# Patient Record
Sex: MALE | Race: WHITE | Employment: OTHER | URBAN - METROPOLITAN AREA
[De-identification: names, ages, dates, MRNs, and addresses within clinical notes are randomized per-mention and may not be internally consistent; named-entity substitution may affect disease eponyms.]

---

## 2017-10-25 ENCOUNTER — TELEPHONE (OUTPATIENT)
Dept: CARDIOLOGY CLINIC | Age: 67
End: 2017-10-25

## 2017-10-25 RX ORDER — LISINOPRIL 5 MG/1
5 TABLET ORAL DAILY
Qty: 90 TABLET | Refills: 3 | Status: SHIPPED | OUTPATIENT
Start: 2017-10-25 | End: 2018-10-24 | Stop reason: SDUPTHER

## 2017-10-25 NOTE — TELEPHONE ENCOUNTER
Medication Refill    When was your last appointment with cardiology? Upcoming appt is sched for 11/15/17   (if 1year or longer, please schedule an appointment)    Medication needing refilled: lisinopril (PRINIVIL;ZESTRIL) 5 MG tablet         Doseage of the medication:    How are you taking this medication (QD, BID, TID, QID, PRN): Take 1 tablet by mouth daily    Patient want a 30 or 90 day supply called in:    Which Pharmacy are we sending the medication to:  [facility], [address] [place] [address]    Pharmacy Phone number:    Pharmacy Fax number:

## 2017-11-21 ENCOUNTER — OFFICE VISIT (OUTPATIENT)
Dept: CARDIOLOGY CLINIC | Age: 67
End: 2017-11-21

## 2017-11-21 VITALS
HEIGHT: 73 IN | BODY MASS INDEX: 30.48 KG/M2 | WEIGHT: 230 LBS | OXYGEN SATURATION: 96 % | SYSTOLIC BLOOD PRESSURE: 130 MMHG | DIASTOLIC BLOOD PRESSURE: 72 MMHG | HEART RATE: 70 BPM

## 2017-11-21 DIAGNOSIS — I35.9 AORTIC VALVE DISORDER: Primary | ICD-10-CM

## 2017-11-21 DIAGNOSIS — I42.8 OTHER CARDIOMYOPATHY (HCC): ICD-10-CM

## 2017-11-21 PROCEDURE — G8417 CALC BMI ABV UP PARAM F/U: HCPCS | Performed by: NURSE PRACTITIONER

## 2017-11-21 PROCEDURE — G8484 FLU IMMUNIZE NO ADMIN: HCPCS | Performed by: NURSE PRACTITIONER

## 2017-11-21 PROCEDURE — 99213 OFFICE O/P EST LOW 20 MIN: CPT | Performed by: NURSE PRACTITIONER

## 2017-11-21 PROCEDURE — 1036F TOBACCO NON-USER: CPT | Performed by: NURSE PRACTITIONER

## 2017-11-21 PROCEDURE — 4040F PNEUMOC VAC/ADMIN/RCVD: CPT | Performed by: NURSE PRACTITIONER

## 2017-11-21 PROCEDURE — 3017F COLORECTAL CA SCREEN DOC REV: CPT | Performed by: NURSE PRACTITIONER

## 2017-11-21 PROCEDURE — 93000 ELECTROCARDIOGRAM COMPLETE: CPT | Performed by: NURSE PRACTITIONER

## 2017-11-21 PROCEDURE — G8427 DOCREV CUR MEDS BY ELIG CLIN: HCPCS | Performed by: NURSE PRACTITIONER

## 2017-11-21 PROCEDURE — 1123F ACP DISCUSS/DSCN MKR DOCD: CPT | Performed by: NURSE PRACTITIONER

## 2017-11-21 RX ORDER — VITAMIN E 268 MG
400 CAPSULE ORAL DAILY
COMMUNITY
End: 2021-03-01

## 2017-11-21 RX ORDER — MULTIVIT WITH MINERALS/LUTEIN
400 TABLET ORAL DAILY
COMMUNITY
End: 2017-11-21 | Stop reason: CLARIF

## 2017-11-21 RX ORDER — CARVEDILOL 3.12 MG/1
3.12 TABLET ORAL 2 TIMES DAILY WITH MEALS
Qty: 180 TABLET | Refills: 3 | Status: SHIPPED | OUTPATIENT
Start: 2017-11-21 | End: 2018-12-05 | Stop reason: SDUPTHER

## 2017-11-21 RX ORDER — CARVEDILOL 3.12 MG/1
3.12 TABLET ORAL 2 TIMES DAILY WITH MEALS
Qty: 180 TABLET | Refills: 3 | Status: SHIPPED | OUTPATIENT
Start: 2017-11-21 | End: 2017-11-21 | Stop reason: SDUPTHER

## 2017-11-21 NOTE — LETTER
(with meals) 180 tablet 3    amoxicillin (AMOXIL) 500 MG capsule Take 2 gm po 30-60 minutes before dental work. 4 capsule 0    Cholecalciferol (VITAMIN D PO) Take by mouth 400 units qd      aspirin 81 MG tablet Take 81 mg by mouth daily. Objective:   PHYSICAL EXAM:        Vitals:    11/21/17 0853 11/21/17 0857 11/21/17 0911   BP: (!) 140/90 124/80 130/72   Site: Left Arm Left Arm Right Arm   Position: Sitting Sitting    Cuff Size: Medium Adult Medium Adult Large Adult   Pulse: 70     SpO2: 96%     Weight: 230 lb (104.3 kg)     Height: 6' 1\" (1.854 m)         VITALS:  /72 (Site: Right Arm, Cuff Size: Large Adult)   Pulse 70   Ht 6' 1\" (1.854 m)   Wt 230 lb (104.3 kg)   SpO2 96%   BMI 30.34 kg/m²    CONSTITUTIONAL: Cooperative, no apparent distress, and appears well nourished / developed  NEUROLOGIC:  Awake and orientated to person, place and time. PSYCH: Calm affect. SKIN: Warm and dry. HEENT: Sclera non-icteric, normocephalic, neck supple, no elevation of JVP, normal carotid pulses with no bruits and thyroid normal size. LUNGS:  No increased work of breathing and clear to auscultation, no crackles or wheezing  CARDIOVASCULAR:  Regular rate with occ ectopic bt 72 and rhythm with no murmurs, gallops, rubs, or abnormal heart sounds, normal PMI. The apical impulses not displaced  JVP less than 8 cm H2O  Heart tones are crisp and normal  Cervical veins are not engorged  The carotid upstroke is normal in amplitude and contour without delay or bruit  JVP is not elevated  ABDOMEN:  Normal bowel sounds, non-distended and non-tender to palpation  EXT: No edema, no calf tenderness. Pulses are present bilaterally.     DATA:      LABS: 10/3/17:   Na+ 146 K+ 4.6 chl 106 CO2 24 BUN 18 creatinine 1.08 glu 88   Vitamin-D 28.8   A1c 5.1%    trig 109 HDL 51     Radiology Review:  Pertinent images / reports were reviewed as a part of this visit and reveals the following:    Last Echo: May '16: my treatment were discussed. The patient is not currently smoking. The risks related to smoking were reviewed with the patient. Recommend maintaining a smoke-free lifestyle. Patient is on a beta-blocker  Patient is on an ace-i  Patient is not on a statin: neg CAD    Antiplatelet therapy has been recommended / prescribed for this patient. Education conducted on adverse reactions including bleeding was discussed. The patient verbalizes understanding not to stop medications without discussing with us. Discussed exercise: 30-60 minutes 7 days/week  Discussed diet. Thank you for allowing to us to participate in the care of [address] [place] [address]. If you have questions, please do not hesitate to call me. I look forward to following Freddy along with you.     Sincerely,        Tania Cerna NP

## 2017-11-21 NOTE — COMMUNICATION BODY
triphasic waveform.  No periaortic fluid or fibrosis.  There is no   aneurysm.  Aorta measures 2.2 cm proximally and tapers to a normal   bifurcation of 1.2 cm.       Iliacs:       Right and left common iliac vessels measure 1.6 and 1.3 cm, right and left   respectively           Impression   No evidence of abdominal aortic aneurysm     11/21/16: MUGA:  Unable to complete MUGA d/t ectopic beats on EKG. Dr. Phillip Reed assessed patient and EKG during attempt to image, explained problem to patient that statistical data would be invalid and MUGA cannot be acquired. Patient acknowledged and will follow up with Dr. Yazan Lopez:     1. Aortic valve disorder   ~s/p repair '96; s/p AVR porcine prothesis Jan '16  ~stable : exercises routinely   ~ASA / BB EKG 12 Lead   2. Other cardiomyopathy ([address]   ~Decreased left ventricular systolic function with an EF 25% by echo '16  ~neg for decompensation   ~remains on BB / ace-i EKG 12 Lead         I had the opportunity to review the clinical symptoms and presentation of Freddy Trinidad. Plan:     1. EKG: sinus rhythm , RBBB  2. F/U in six months     Overall the patient is stable from CV standpoint    I have addresed the patient's cardiac risk factors and adjusted pharmacologic treatment as needed. In addition, I have reinforced the need for patient directed risk factor modification. Further evaluation will be based upon the patient's clinical course and testing results. All questions and concerns were addressed to the patient. Alternatives to my treatment were discussed. The patient is not currently smoking. The risks related to smoking were reviewed with the patient. Recommend maintaining a smoke-free lifestyle. Patient is on a beta-blocker  Patient is on an ace-i  Patient is not on a statin: neg CAD    Antiplatelet therapy has been recommended / prescribed for this patient. Education conducted on adverse reactions including bleeding was discussed.     The

## 2017-11-21 NOTE — PROGRESS NOTES
amplitude and contour without delay or bruit  JVP is not elevated  ABDOMEN:  Normal bowel sounds, non-distended and non-tender to palpation  EXT: No edema, no calf tenderness. Pulses are present bilaterally. DATA:      LABS: 10/3/17:   Na+ 146 K+ 4.6 chl 106 CO2 24 BUN 18 creatinine 1.08 glu 88   Vitamin-D 28.8   A1c 5.1%    trig 109 HDL 51     Radiology Review:  Pertinent images / reports were reviewed as a part of this visit and reveals the following:    MUGA: Feb '15:  MUGA Conclusions    Abnormal resting left ventricular function with moderate-severe global    hypokinesis and EF= 36% .    Study quality is hampered by frequent arrhythmia. Last Echo: May '16:  Summary  Mildly dilated left ventricle. Mild concentric left ventricular hypertrophy. Decreased left ventricular systolic function with an EF 25%. Mild to moderate mitral regurgitation. Dilated left atrium. Mild tricuspid regurgitation with RVSP estimated at 34 mmHg. Pacemaker / ICD lead was visualized in the right atrium. Trivial pulmonic regurgitation. US aorta: Nov '16:  FINDINGS:   Aorta:       Normal triphasic waveform.  No periaortic fluid or fibrosis.  There is no   aneurysm.  Aorta measures 2.2 cm proximally and tapers to a normal   bifurcation of 1.2 cm.       Iliacs:       Right and left common iliac vessels measure 1.6 and 1.3 cm, right and left   respectively           Impression   No evidence of abdominal aortic aneurysm     11/21/16: MUGA:  Unable to complete MUGA d/t ectopic beats on EKG. Dr. Liliana Sherwood assessed patient and EKG during attempt to image, explained problem to patient that statistical data would be invalid and MUGA cannot be acquired. Patient acknowledged and will follow up with Dr. Maxim Bahena:     1. Aortic valve disorder   ~s/p repair '96; s/p AVR porcine prothesis Jan '16  ~stable : exercises routinely   ~ASA / BB EKG 12 Lead   2.  Other cardiomyopathy ([address]   ~Decreased left ventricular systolic function with an EF 25% by echo '16  ~neg for decompensation   ~remains on BB / ace-i EKG 12 Lead         I had the opportunity to review the clinical symptoms and presentation of Freddy Trinidad. Plan:     1. EKG: sinus rhythm , RBBB  2. F/U in six months     Overall the patient is stable from CV standpoint    I have addresed the patient's cardiac risk factors and adjusted pharmacologic treatment as needed. In addition, I have reinforced the need for patient directed risk factor modification. Further evaluation will be based upon the patient's clinical course and testing results. All questions and concerns were addressed to the patient. Alternatives to my treatment were discussed. The patient is not currently smoking. The risks related to smoking were reviewed with the patient. Recommend maintaining a smoke-free lifestyle. Patient is on a beta-blocker  Patient is on an ace-i  Patient is not on a statin: neg CAD    Antiplatelet therapy has been recommended / prescribed for this patient. Education conducted on adverse reactions including bleeding was discussed. The patient verbalizes understanding not to stop medications without discussing with us. Discussed exercise: 30-60 minutes 7 days/week  Discussed diet. Thank you for allowing to us to participate in the care of [address] [place] [address].     AMY Moscoso    Documentation of today's visit sent to PCP

## 2018-05-30 ENCOUNTER — OFFICE VISIT (OUTPATIENT)
Dept: CARDIOLOGY CLINIC | Age: 68
End: 2018-05-30

## 2018-05-30 VITALS
DIASTOLIC BLOOD PRESSURE: 80 MMHG | SYSTOLIC BLOOD PRESSURE: 110 MMHG | HEIGHT: 73 IN | WEIGHT: 225 LBS | BODY MASS INDEX: 29.82 KG/M2 | HEART RATE: 60 BPM

## 2018-05-30 DIAGNOSIS — Z95.810 AUTOMATIC IMPLANTABLE CARDIOVERTER-DEFIBRILLATOR IN SITU: Chronic | ICD-10-CM

## 2018-05-30 DIAGNOSIS — I35.9 AORTIC VALVE DISORDER: Chronic | ICD-10-CM

## 2018-05-30 DIAGNOSIS — I42.9 PRIMARY CARDIOMYOPATHY (HCC): Chronic | ICD-10-CM

## 2018-05-30 DIAGNOSIS — E78.5 HYPERLIPIDEMIA, UNSPECIFIED HYPERLIPIDEMIA TYPE: ICD-10-CM

## 2018-05-30 DIAGNOSIS — I35.0 NONRHEUMATIC AORTIC VALVE STENOSIS: Primary | ICD-10-CM

## 2018-05-30 PROCEDURE — 1123F ACP DISCUSS/DSCN MKR DOCD: CPT | Performed by: INTERNAL MEDICINE

## 2018-05-30 PROCEDURE — 1036F TOBACCO NON-USER: CPT | Performed by: INTERNAL MEDICINE

## 2018-05-30 PROCEDURE — 4040F PNEUMOC VAC/ADMIN/RCVD: CPT | Performed by: INTERNAL MEDICINE

## 2018-05-30 PROCEDURE — 3017F COLORECTAL CA SCREEN DOC REV: CPT | Performed by: INTERNAL MEDICINE

## 2018-05-30 PROCEDURE — G8427 DOCREV CUR MEDS BY ELIG CLIN: HCPCS | Performed by: INTERNAL MEDICINE

## 2018-05-30 PROCEDURE — 99213 OFFICE O/P EST LOW 20 MIN: CPT | Performed by: INTERNAL MEDICINE

## 2018-05-30 PROCEDURE — G8417 CALC BMI ABV UP PARAM F/U: HCPCS | Performed by: INTERNAL MEDICINE

## 2018-10-24 RX ORDER — LISINOPRIL 5 MG/1
TABLET ORAL
Qty: 90 TABLET | Refills: 3 | Status: SHIPPED | OUTPATIENT
Start: 2018-10-24 | End: 2019-02-07 | Stop reason: SDUPTHER

## 2018-12-07 RX ORDER — CARVEDILOL 3.12 MG/1
TABLET ORAL
Qty: 180 TABLET | Refills: 0 | Status: SHIPPED | OUTPATIENT
Start: 2018-12-07 | End: 2019-03-12 | Stop reason: SDUPTHER

## 2019-01-10 ENCOUNTER — TELEPHONE (OUTPATIENT)
Dept: CARDIOLOGY CLINIC | Age: 69
End: 2019-01-10

## 2019-02-07 ENCOUNTER — OFFICE VISIT (OUTPATIENT)
Dept: CARDIOLOGY CLINIC | Age: 69
End: 2019-02-07
Payer: MEDICARE

## 2019-02-07 ENCOUNTER — HOSPITAL ENCOUNTER (OUTPATIENT)
Dept: NON INVASIVE DIAGNOSTICS | Age: 69
Discharge: HOME | End: 2019-02-07
Payer: MEDICARE

## 2019-02-07 VITALS
BODY MASS INDEX: 29.42 KG/M2 | WEIGHT: 222 LBS | SYSTOLIC BLOOD PRESSURE: 130 MMHG | HEART RATE: 63 BPM | HEIGHT: 73 IN | OXYGEN SATURATION: 96 % | DIASTOLIC BLOOD PRESSURE: 86 MMHG

## 2019-02-07 DIAGNOSIS — I15.9 SECONDARY HYPERTENSION: ICD-10-CM

## 2019-02-07 DIAGNOSIS — I35.9 AORTIC VALVE DISORDER: Primary | Chronic | ICD-10-CM

## 2019-02-07 DIAGNOSIS — I42.9 PRIMARY CARDIOMYOPATHY (HCC): Chronic | ICD-10-CM

## 2019-02-07 DIAGNOSIS — I49.3 PVC (PREMATURE VENTRICULAR CONTRACTION): ICD-10-CM

## 2019-02-07 DIAGNOSIS — R00.2 PALPITATIONS: ICD-10-CM

## 2019-02-07 PROBLEM — I10 HYPERTENSION: Status: ACTIVE | Noted: 2019-02-07

## 2019-02-07 LAB
LEFT VENTRICULAR EJECTION FRACTION HIGH VALUE: 35 %
LEFT VENTRICULAR EJECTION FRACTION MODE: NORMAL
LV EF: 30 %
LV EF: 33 %
LVEF MODALITY: NORMAL

## 2019-02-07 PROCEDURE — 4040F PNEUMOC VAC/ADMIN/RCVD: CPT | Performed by: INTERNAL MEDICINE

## 2019-02-07 PROCEDURE — 3017F COLORECTAL CA SCREEN DOC REV: CPT | Performed by: INTERNAL MEDICINE

## 2019-02-07 PROCEDURE — 93306 TTE W/DOPPLER COMPLETE: CPT

## 2019-02-07 PROCEDURE — 93000 ELECTROCARDIOGRAM COMPLETE: CPT | Performed by: INTERNAL MEDICINE

## 2019-02-07 PROCEDURE — 1101F PT FALLS ASSESS-DOCD LE1/YR: CPT | Performed by: INTERNAL MEDICINE

## 2019-02-07 PROCEDURE — 93225 XTRNL ECG REC<48 HRS REC: CPT | Performed by: INTERNAL MEDICINE

## 2019-02-07 PROCEDURE — 99214 OFFICE O/P EST MOD 30 MIN: CPT | Performed by: INTERNAL MEDICINE

## 2019-02-07 PROCEDURE — G8417 CALC BMI ABV UP PARAM F/U: HCPCS | Performed by: INTERNAL MEDICINE

## 2019-02-07 PROCEDURE — G8427 DOCREV CUR MEDS BY ELIG CLIN: HCPCS | Performed by: INTERNAL MEDICINE

## 2019-02-07 PROCEDURE — 1036F TOBACCO NON-USER: CPT | Performed by: INTERNAL MEDICINE

## 2019-02-07 PROCEDURE — 1123F ACP DISCUSS/DSCN MKR DOCD: CPT | Performed by: INTERNAL MEDICINE

## 2019-02-07 PROCEDURE — G8484 FLU IMMUNIZE NO ADMIN: HCPCS | Performed by: INTERNAL MEDICINE

## 2019-02-07 RX ORDER — LISINOPRIL 10 MG/1
10 TABLET ORAL DAILY
Qty: 90 TABLET | Refills: 3 | Status: SHIPPED | OUTPATIENT
Start: 2019-02-07 | End: 2019-11-13 | Stop reason: ALTCHOICE

## 2019-02-08 ENCOUNTER — OFFICE VISIT (OUTPATIENT)
Dept: CARDIOLOGY CLINIC | Age: 69
End: 2019-02-08
Payer: MEDICARE

## 2019-02-08 VITALS
HEIGHT: 73 IN | BODY MASS INDEX: 30.76 KG/M2 | WEIGHT: 232.1 LBS | DIASTOLIC BLOOD PRESSURE: 80 MMHG | SYSTOLIC BLOOD PRESSURE: 158 MMHG | HEART RATE: 76 BPM

## 2019-02-08 DIAGNOSIS — I42.0 DILATED CARDIOMYOPATHY (HCC): Primary | ICD-10-CM

## 2019-02-08 DIAGNOSIS — I10 BENIGN ESSENTIAL HTN: ICD-10-CM

## 2019-02-08 DIAGNOSIS — I49.3 PVC (PREMATURE VENTRICULAR CONTRACTION): ICD-10-CM

## 2019-02-08 DIAGNOSIS — E78.2 MIXED HYPERLIPIDEMIA: ICD-10-CM

## 2019-02-08 DIAGNOSIS — Z95.2 S/P AVR: ICD-10-CM

## 2019-02-08 PROCEDURE — 3017F COLORECTAL CA SCREEN DOC REV: CPT | Performed by: INTERNAL MEDICINE

## 2019-02-08 PROCEDURE — 99215 OFFICE O/P EST HI 40 MIN: CPT | Performed by: INTERNAL MEDICINE

## 2019-02-08 PROCEDURE — G8417 CALC BMI ABV UP PARAM F/U: HCPCS | Performed by: INTERNAL MEDICINE

## 2019-02-08 PROCEDURE — G8427 DOCREV CUR MEDS BY ELIG CLIN: HCPCS | Performed by: INTERNAL MEDICINE

## 2019-02-08 PROCEDURE — 1123F ACP DISCUSS/DSCN MKR DOCD: CPT | Performed by: INTERNAL MEDICINE

## 2019-02-08 PROCEDURE — 1101F PT FALLS ASSESS-DOCD LE1/YR: CPT | Performed by: INTERNAL MEDICINE

## 2019-02-08 PROCEDURE — 1036F TOBACCO NON-USER: CPT | Performed by: INTERNAL MEDICINE

## 2019-02-08 PROCEDURE — 4040F PNEUMOC VAC/ADMIN/RCVD: CPT | Performed by: INTERNAL MEDICINE

## 2019-02-08 PROCEDURE — G8484 FLU IMMUNIZE NO ADMIN: HCPCS | Performed by: INTERNAL MEDICINE

## 2019-02-15 PROCEDURE — 93227 XTRNL ECG REC<48 HR R&I: CPT | Performed by: INTERNAL MEDICINE

## 2019-02-18 ENCOUNTER — TELEPHONE (OUTPATIENT)
Dept: CARDIOLOGY CLINIC | Age: 69
End: 2019-02-18

## 2019-03-15 RX ORDER — CARVEDILOL 3.12 MG/1
3.12 TABLET ORAL 2 TIMES DAILY WITH MEALS
Qty: 180 TABLET | Refills: 1 | Status: SHIPPED | OUTPATIENT
Start: 2019-03-15 | End: 2019-06-15 | Stop reason: SDUPTHER

## 2019-03-15 RX ORDER — CARVEDILOL 3.12 MG/1
TABLET ORAL
Qty: 180 TABLET | Refills: 0 | Status: SHIPPED | OUTPATIENT
Start: 2019-03-15 | End: 2019-05-28 | Stop reason: SDUPTHER

## 2019-05-23 NOTE — PROGRESS NOTES
[address]   Cardiac Followup    Referring Provider:  Mendel Bers, MD     Chief  Complaint--AVR, CM, PVCs    History of Present Illness:  Mr Shae Meza is a  68 y.o.male seen as a follow up for  AVR(repair at  [facility], redo ) cardiomyopathy(AICD--second device--battery , was turned off,  At the time, EF 45%)htn, hchol    Today, he has no exertional or resting chest pain,sob, palpitations dizziness. He has  No edema,  No syncope. He is active, able to mow the yard about 1 1/2 hours without stopping or symptoms. Patient is compliant  with medication and is tolerating them well without side effects. He has frequent PVCs on Holter and is considering ICD upgrade and or PVC ablation with Dr Israel Chang. Past Medical History:   has a past medical history of Hypertension, Non-ischemic cardiomyopathy ([address] and Severe aortic stenosis. Surgical History:   has a past surgical history that includes hernia repair (Right, ); Aortic valvuloplasty (); Cardiac defibrillator placement (/ ); Aortic valve replacement (2016); Coronary artery bypass graft; and Cataract removal with implant (Left). Social History:   reports that he has never smoked. He has never used smokeless tobacco. He reports that he drinks about 1.8 - 2.4 oz of alcohol per week. He reports that he does not use drugs. Family History:  family history is not on file. Home Medications:  Current Outpatient Medications   Medication Sig Dispense Refill    carvedilol (COREG) 3.125 MG tablet Take 1 tablet by mouth 2 times daily (with meals) 180 tablet 1    lisinopril (PRINIVIL;ZESTRIL) 10 MG tablet Take 1 tablet by mouth daily 90 tablet 3    vitamin E 400 UNIT capsule Take 400 Units by mouth daily      Cholecalciferol (VITAMIN D PO) Take 1,000 Units by mouth daily       aspirin 81 MG tablet Take 81 mg by mouth daily.       amoxicillin (AMOXIL) 500 MG capsule Take 2 gm po 30-60 minutes before dental work. 4 capsule 0     No current facility-administered medications for this visit. Allergies:  Patient has no known allergies. Review of Systems:   · Constitutional: there has been no unanticipated weight loss. There's been no change in energy level, sleep pattern, or activity level. · Eyes: No visual changes or diplopia. No scleral icterus. · ENT: No Headaches, hearing loss or vertigo. No mouth sores or sore throat. · Cardiovascular: Reviewed in HPI  · Respiratory: No cough or wheezing, no sputum production. No hematemesis. · Gastrointestinal: No abdominal pain, appetite loss, blood in stools. No change in bowel or bladder habits. · Genitourinary: No dysuria, trouble voiding, or hematuria. · Musculoskeletal:  No gait disturbance, weakness or joint complaints. · Integumentary: No rash or pruritis. · Neurological: No headache, diplopia, change in muscle strength, numbness or tingling. No change in gait, balance, coordination, mood, affect, memory, mentation, behavior. · Psychiatric: No anxiety, no depression. · Endocrine: No malaise, fatigue or temperature intolerance. No excessive thirst, fluid intake, or urination. No tremor. · Hematologic/Lymphatic: No abnormal bruising or bleeding, blood clots or swollen lymph nodes. · Allergic/Immunologic: No nasal congestion or hives. Physical Examination:    Vitals:    05/28/19 0911   BP: 120/86   Pulse:    SpO2:         Constitutional and General Appearance:   . NAD   SKIN:  .     Warm and dry  EYES:    .     EOMI  Neck:   . Normal carotid contour  Respiratory:  Normal excursion and expansion without use of accessory muscles  Resp Auscultation: Normal breath sounds without dullness  Cardiovascular: The apical impulses not displaced  Heart tones are crisp and normal  Cervical veins are not engorged  Normal S1S2, No S3, No Murmur  Peripheral pulses are symmetrical and full  Extremities:   There is no clubbing, cyanosis of the extremities. No edema  Femoral Arteries: 2+ and equal  Pedal Pulses: 2+ and equal   Abdomen:  No masses or tenderness  Liver/Spleen: No Abnormalities Noted  Neurological/Psychiatric:  Alert and oriented in all spheres  Moves all extremities well  Exhibits normal gait balance and coordination  No abnormalities of mood, affect, memory      All testing and labs listed below were personally reviewed. 2/2015  MUGA  Ef 36%    2015--normal coronaries    2015 carotids--no significant stenosis  2016 abd-no AAA    Assessment:   Aortic valve disorder/Aortic Valve repair 1996, redo 1/16  Stable  Will repeat echo in one year    Nonischemic cardiomyopathy  5/20/16 echo EF 25%  2/7/19  Echo  EF 30-35%  No  Signs of chf on exam  CRT not  Thought to be helpful by Dr Miranda Dec exam 2/8/19  Will discuss AICD with him    htn--/86 (Site: Left Upper Arm, Position: Sitting, Cuff Size: Medium Adult)   Pulse 52   Ht 6' 1\" (1.854 m)   Wt 228 lb 6.4 oz (103.6 kg)   SpO2 96%   BMI 30.13 kg/m²    Stable,tolerating coreg and  lisinopril      PVC's--1st degrees AV block, RBBB left axis bifasicular block,multiple pvc's, trigeminal pvc's--discussed with Dr Powell--recommend Chris John this is placed, will then be able to optimize medications for  Cardiomyopathy  2/19 holter monitor  bigeminy trigeminy  Ablation will be arranged with Dr Kenzie Mar:  Refer back to Dr Ana Torres regarding upgrade of  Pacemaker and ablation--will  review holter monitor with Dr Ana Torres  follow  Up in 6 months    Thank you for allowing me to participate in the care of this individual.    Sudha Garcia M.D., [facility]. Scribe Attestation:  Cristina Nixon am scribing for and in the presence of Yasemin Rico MD.   Sean Pallas 05/28/19 9:45 AM   Provider Maxine Norris is working as a scribe for and in the presence of me Yasemin Rico MD).   Working as a scribe, Angelica may have prepopulated components of this note with my historical  intellectual property under my direct supervision. Any additions to this intellectual property were performed in my presence and at my direction. Furthermore, the content and accuracy of this note have been reviewed by me Foreign Henry MD).

## 2019-05-28 ENCOUNTER — OFFICE VISIT (OUTPATIENT)
Dept: CARDIOLOGY CLINIC | Age: 69
End: 2019-05-28
Payer: MEDICARE

## 2019-05-28 VITALS
BODY MASS INDEX: 30.27 KG/M2 | HEIGHT: 73 IN | SYSTOLIC BLOOD PRESSURE: 120 MMHG | DIASTOLIC BLOOD PRESSURE: 86 MMHG | WEIGHT: 228.4 LBS | OXYGEN SATURATION: 96 % | HEART RATE: 52 BPM

## 2019-05-28 DIAGNOSIS — Z95.2 S/P AVR: ICD-10-CM

## 2019-05-28 DIAGNOSIS — I42.0 DILATED CARDIOMYOPATHY (HCC): Primary | ICD-10-CM

## 2019-05-28 DIAGNOSIS — I49.3 PVC (PREMATURE VENTRICULAR CONTRACTION): ICD-10-CM

## 2019-05-28 PROCEDURE — 1036F TOBACCO NON-USER: CPT | Performed by: INTERNAL MEDICINE

## 2019-05-28 PROCEDURE — G8427 DOCREV CUR MEDS BY ELIG CLIN: HCPCS | Performed by: INTERNAL MEDICINE

## 2019-05-28 PROCEDURE — G8417 CALC BMI ABV UP PARAM F/U: HCPCS | Performed by: INTERNAL MEDICINE

## 2019-05-28 PROCEDURE — 4040F PNEUMOC VAC/ADMIN/RCVD: CPT | Performed by: INTERNAL MEDICINE

## 2019-05-28 PROCEDURE — 3017F COLORECTAL CA SCREEN DOC REV: CPT | Performed by: INTERNAL MEDICINE

## 2019-05-28 PROCEDURE — 99214 OFFICE O/P EST MOD 30 MIN: CPT | Performed by: INTERNAL MEDICINE

## 2019-05-28 PROCEDURE — 1123F ACP DISCUSS/DSCN MKR DOCD: CPT | Performed by: INTERNAL MEDICINE

## 2019-05-30 ENCOUNTER — TELEPHONE (OUTPATIENT)
Dept: CARDIOLOGY CLINIC | Age: 69
End: 2019-05-30

## 2019-05-30 NOTE — TELEPHONE ENCOUNTER
Pt saw DGB on 5-28 and Vickie Barrios asked him to call her if he didn't hear anything from [facility] scheduling an appt. He hasn't heard anything so he is calling hospitals back. Pls call to advise. Thank you.

## 2019-05-31 NOTE — TELEPHONE ENCOUNTER
Per Dr Neelam Murray  Please schedule patient for an ablation and an ICD upgrade  He has discussed it with the patient  RACHELB/MM

## 2019-06-10 ENCOUNTER — TELEPHONE (OUTPATIENT)
Dept: CARDIOLOGY CLINIC | Age: 69
End: 2019-06-10

## 2019-06-11 ENCOUNTER — TELEPHONE (OUTPATIENT)
Dept: CARDIOLOGY CLINIC | Age: 69
End: 2019-06-11

## 2019-06-11 NOTE — TELEPHONE ENCOUNTER
CARDIAC CLEARANCE     What type of procedure are you having? lens correction of left eye cataract     Which physician is performing your procedure? Not sure of yet    When is your procedure scheduled for? 6-13    Where are you having this procedure? [facility]    Are you taking Blood Thinners? If so what? (Name/dose/frequesncy)     Does the surgeon want you to stop your blood thinner? If so for how long? Phone Number and Contact Name for Physicians office: [phone]    Fax number to send Bedřicha Smetany [facility] Surgical Coordinator Isaura Ferreira [phone] with any questions.

## 2019-06-11 NOTE — LETTER
[facility] [address] [address]  Phone: [phone]  Fax: [phone]    Lucia Redd MD        June 11, 2019     Patient: Kushal Koroma   YOB: 1950   Date of Visit: 6/11/2019       To Whom It May Concern: It is my medical opinion that Minda Gouty patient can be cleared. .    If you have any questions or concerns, please don't hesitate to call.     Sincerely,        Lucia Redd MD

## 2019-06-17 RX ORDER — CARVEDILOL 3.12 MG/1
3.12 TABLET ORAL 2 TIMES DAILY WITH MEALS
Qty: 180 TABLET | Refills: 3 | Status: SHIPPED | OUTPATIENT
Start: 2019-06-17 | End: 2020-05-21 | Stop reason: SDUPTHER

## 2019-08-13 ENCOUNTER — HOSPITAL ENCOUNTER (OUTPATIENT)
Dept: CARDIAC CATH/INVASIVE PROCEDURES | Age: 69
Discharge: HOME | End: 2019-08-14
Attending: INTERNAL MEDICINE | Admitting: INTERNAL MEDICINE
Payer: MEDICARE

## 2019-08-13 PROBLEM — I49.3 PVC (PREMATURE VENTRICULAR CONTRACTION): Status: ACTIVE | Noted: 2019-08-13

## 2019-08-13 LAB
EKG ATRIAL RATE: 67 BPM
EKG DIAGNOSIS: NORMAL
EKG P AXIS: 40 DEGREES
EKG P-R INTERVAL: 208 MS
EKG Q-T INTERVAL: 482 MS
EKG QRS DURATION: 188 MS
EKG QTC CALCULATION (BAZETT): 509 MS
EKG R AXIS: -77 DEGREES
EKG T AXIS: 67 DEGREES
EKG VENTRICULAR RATE: 67 BPM
GFR AFRICAN AMERICAN: >60
GFR NON-AFRICAN AMERICAN: 60
GLUCOSE BLD-MCNC: 93 MG/DL (ref 70–99)
HEMOGLOBIN: 14.5 G/DL (ref 13.5–17.5)
PERFORMED ON: ABNORMAL
PLATELET # BLD: 134 K/UL (ref 135–450)
POC BUN: 20 MG/DL (ref 7–18)
POC CREATININE: 1.2 MG/DL (ref 0.8–1.3)
POC HEMATOCRIT: 40 % (ref 40.5–52.5)
POC POTASSIUM: 4.1 MMOL/L (ref 3.5–5.1)
POC SAMPLE TYPE: ABNORMAL
POC SODIUM: 144 MMOL/L (ref 136–145)
WBC # BLD: 5.9 K/UL (ref 4–11)

## 2019-08-13 PROCEDURE — 93623 PRGRMD STIMJ&PACG IV RX NFS: CPT | Performed by: INTERNAL MEDICINE

## 2019-08-13 PROCEDURE — 93462 L HRT CATH TRNSPTL PUNCTURE: CPT | Performed by: INTERNAL MEDICINE

## 2019-08-13 PROCEDURE — 2500000003 HC RX 250 WO HCPCS

## 2019-08-13 PROCEDURE — 6360000002 HC RX W HCPCS

## 2019-08-13 PROCEDURE — 99152 MOD SED SAME PHYS/QHP 5/>YRS: CPT | Performed by: INTERNAL MEDICINE

## 2019-08-13 PROCEDURE — 33262 RMVL& REPLC PULSE GEN 1 LEAD: CPT

## 2019-08-13 PROCEDURE — 85014 HEMATOCRIT: CPT

## 2019-08-13 PROCEDURE — 2720000010 HC SURG SUPPLY STERILE

## 2019-08-13 PROCEDURE — 85347 COAGULATION TIME ACTIVATED: CPT

## 2019-08-13 PROCEDURE — 93654 COMPRE EP EVAL TX VT: CPT | Performed by: INTERNAL MEDICINE

## 2019-08-13 PROCEDURE — 93623 PRGRMD STIMJ&PACG IV RX NFS: CPT

## 2019-08-13 PROCEDURE — 93010 ELECTROCARDIOGRAM REPORT: CPT | Performed by: INTERNAL MEDICINE

## 2019-08-13 PROCEDURE — 99152 MOD SED SAME PHYS/QHP 5/>YRS: CPT

## 2019-08-13 PROCEDURE — 82947 ASSAY GLUCOSE BLOOD QUANT: CPT

## 2019-08-13 PROCEDURE — 93462 L HRT CATH TRNSPTL PUNCTURE: CPT

## 2019-08-13 PROCEDURE — 93662 INTRACARDIAC ECG (ICE): CPT | Performed by: INTERNAL MEDICINE

## 2019-08-13 PROCEDURE — 99153 MOD SED SAME PHYS/QHP EA: CPT

## 2019-08-13 PROCEDURE — 84295 ASSAY OF SERUM SODIUM: CPT

## 2019-08-13 PROCEDURE — C1759 CATH, INTRA ECHOCARDIOGRAPHY: HCPCS

## 2019-08-13 PROCEDURE — 94760 N-INVAS EAR/PLS OXIMETRY 1: CPT

## 2019-08-13 PROCEDURE — C1894 INTRO/SHEATH, NON-LASER: HCPCS

## 2019-08-13 PROCEDURE — 2580000003 HC RX 258

## 2019-08-13 PROCEDURE — 6370000000 HC RX 637 (ALT 250 FOR IP): Performed by: INTERNAL MEDICINE

## 2019-08-13 PROCEDURE — 85048 AUTOMATED LEUKOCYTE COUNT: CPT

## 2019-08-13 PROCEDURE — 2580000003 HC RX 258: Performed by: INTERNAL MEDICINE

## 2019-08-13 PROCEDURE — 33262 RMVL& REPLC PULSE GEN 1 LEAD: CPT | Performed by: INTERNAL MEDICINE

## 2019-08-13 PROCEDURE — 93662 INTRACARDIAC ECG (ICE): CPT

## 2019-08-13 PROCEDURE — 93005 ELECTROCARDIOGRAM TRACING: CPT | Performed by: INTERNAL MEDICINE

## 2019-08-13 PROCEDURE — C1781 MESH (IMPLANTABLE): HCPCS

## 2019-08-13 PROCEDURE — 93654 COMPRE EP EVAL TX VT: CPT

## 2019-08-13 PROCEDURE — C1769 GUIDE WIRE: HCPCS

## 2019-08-13 PROCEDURE — 84132 ASSAY OF SERUM POTASSIUM: CPT

## 2019-08-13 PROCEDURE — C1732 CATH, EP, DIAG/ABL, 3D/VECT: HCPCS

## 2019-08-13 PROCEDURE — 85049 AUTOMATED PLATELET COUNT: CPT

## 2019-08-13 PROCEDURE — 85018 HEMOGLOBIN: CPT

## 2019-08-13 PROCEDURE — C1722 AICD, SINGLE CHAMBER: HCPCS

## 2019-08-13 PROCEDURE — 36415 COLL VENOUS BLD VENIPUNCTURE: CPT

## 2019-08-13 PROCEDURE — 84520 ASSAY OF UREA NITROGEN: CPT

## 2019-08-13 PROCEDURE — 93621 COMP EP EVL L PAC&REC C SINS: CPT

## 2019-08-13 PROCEDURE — 82565 ASSAY OF CREATININE: CPT

## 2019-08-13 PROCEDURE — 93621 COMP EP EVL L PAC&REC C SINS: CPT | Performed by: INTERNAL MEDICINE

## 2019-08-13 RX ORDER — SODIUM CHLORIDE 0.9 % (FLUSH) 0.9 %
10 SYRINGE (ML) INJECTION PRN
Status: DISCONTINUED | OUTPATIENT
Start: 2019-08-13 | End: 2019-08-14 | Stop reason: HOSPADM

## 2019-08-13 RX ORDER — CARVEDILOL 3.12 MG/1
3.12 TABLET ORAL 2 TIMES DAILY WITH MEALS
Status: DISCONTINUED | OUTPATIENT
Start: 2019-08-13 | End: 2019-08-14 | Stop reason: HOSPADM

## 2019-08-13 RX ORDER — ASPIRIN 81 MG/1
81 TABLET ORAL DAILY
Status: DISCONTINUED | OUTPATIENT
Start: 2019-08-13 | End: 2019-08-14 | Stop reason: HOSPADM

## 2019-08-13 RX ORDER — VITAMIN E 268 MG
400 CAPSULE ORAL DAILY
Status: DISCONTINUED | OUTPATIENT
Start: 2019-08-13 | End: 2019-08-13 | Stop reason: RX

## 2019-08-13 RX ORDER — ACETAMINOPHEN 325 MG/1
650 TABLET ORAL EVERY 4 HOURS PRN
Status: DISCONTINUED | OUTPATIENT
Start: 2019-08-13 | End: 2019-08-14 | Stop reason: HOSPADM

## 2019-08-13 RX ORDER — LISINOPRIL 10 MG/1
10 TABLET ORAL DAILY
Status: DISCONTINUED | OUTPATIENT
Start: 2019-08-13 | End: 2019-08-14 | Stop reason: HOSPADM

## 2019-08-13 RX ORDER — SODIUM CHLORIDE 0.9 % (FLUSH) 0.9 %
10 SYRINGE (ML) INJECTION EVERY 12 HOURS SCHEDULED
Status: DISCONTINUED | OUTPATIENT
Start: 2019-08-13 | End: 2019-08-14 | Stop reason: HOSPADM

## 2019-08-13 RX ADMIN — LISINOPRIL 10 MG: 10 TABLET ORAL at 18:47

## 2019-08-13 RX ADMIN — CARVEDILOL 3.12 MG: 3.12 TABLET, FILM COATED ORAL at 18:47

## 2019-08-13 RX ADMIN — ASPIRIN 81 MG: 81 TABLET, COATED ORAL at 18:49

## 2019-08-13 RX ADMIN — Medication 10 ML: at 20:58

## 2019-08-13 ASSESSMENT — PAIN SCALES - GENERAL
PAINLEVEL_OUTOF10: 0

## 2019-08-13 NOTE — H&P
[address] [facility]   Electrophysiology   Reason for Consultation: Luke Clifford Requesting Physician: Manda Hardin MD      Chief Complaint   Patient presents with    Follow-up     per  to discuss BiVICD    Hypertension        HPI: Mohamud Triplett is a 77 y.o. male seen for discussion of BiV upgrade on his device. He has a history of AVR(repair at [facility], redo1/16)cardiomyopathy, (AICD second device--battery , was turned off, at the time  His EF was  45%), HTN, Mindi Arellano is very active. He plays golf and is able to mow the lawn without fatigue. His EF has decreased again to 30-35%. He has frequent PVC's on ECG    Past Medical History:   Diagnosis Date    Hypertension     Non-ischemic cardiomyopathy ([address]     Severe aortic stenosis         Past Surgical History:   Procedure Laterality Date    AORTIC VALVE REPLACEMENT  2016    Dr. Christine Fuller - 27 mm Mosaic Ultra porcine valve #[id number]    AORTIC VALVULOPLASTY      AV repair @ [facility]  [phone]     for CM/ now turned off does not need    CATARACT REMOVAL WITH IMPLANT Left     CORONARY ARTERY BYPASS GRAFT      HERNIA REPAIR Right 2010       Allergies:  No Known Allergies    Social History:   reports that he has never smoked. He has never used smokeless tobacco. He reports that he drinks about 1.8 - 2.4 oz of alcohol per week . He reports that he does not use drugs. Family History:     Reviewed. Denies family history of sudden cardiac death, arrhythmia, premature CAD    Review of System:  All other systems reviewed and are negative except for that noted above.  Pertinent negatives are:   General: negative for fever, chills   Ophthalmic ROS: negative for - eye pain or loss of vision  ENT ROS: negative for - headaches, sore throat   Respiratory: negative for - cough, sputum  Cardiovascular: Reviewed in HPI  Gastrointestinal: negative for - abdominal pain,

## 2019-08-14 VITALS
TEMPERATURE: 96.2 F | SYSTOLIC BLOOD PRESSURE: 145 MMHG | HEART RATE: 75 BPM | WEIGHT: 218.48 LBS | OXYGEN SATURATION: 97 % | DIASTOLIC BLOOD PRESSURE: 78 MMHG | BODY MASS INDEX: 28.96 KG/M2 | HEIGHT: 73 IN | RESPIRATION RATE: 16 BRPM

## 2019-08-14 PROCEDURE — 99217 PR OBSERVATION CARE DISCHARGE MANAGEMENT: CPT | Performed by: NURSE PRACTITIONER

## 2019-08-14 PROCEDURE — 2580000003 HC RX 258

## 2019-08-14 PROCEDURE — 2500000003 HC RX 250 WO HCPCS

## 2019-08-14 PROCEDURE — 2580000003 HC RX 258: Performed by: INTERNAL MEDICINE

## 2019-08-14 PROCEDURE — 6370000000 HC RX 637 (ALT 250 FOR IP): Performed by: INTERNAL MEDICINE

## 2019-08-14 RX ADMIN — LISINOPRIL 10 MG: 10 TABLET ORAL at 08:42

## 2019-08-14 RX ADMIN — CARVEDILOL 3.12 MG: 3.12 TABLET, FILM COATED ORAL at 08:42

## 2019-08-14 RX ADMIN — ASPIRIN 81 MG: 81 TABLET, COATED ORAL at 08:42

## 2019-08-14 RX ADMIN — VITAMIN D, TAB 1000IU (100/BT) 1000 UNITS: 25 TAB at 08:42

## 2019-08-14 RX ADMIN — Medication 10 ML: at 08:42

## 2019-08-14 ASSESSMENT — PAIN SCALES - GENERAL
PAINLEVEL_OUTOF10: 0

## 2019-08-14 NOTE — DISCHARGE SUMMARY
EP Discharge Summary  [address]    Patient :Juanito Tompkins  Room/Bed: [facility]-6084/5242-75  Medical Record: [id number]  YOB: 1950    Admit date: 2019  Discharge date: 19     Admitting Physician: Wilbert Gambino MD   Discharge NP: Stephen Kinney CNP    Admission Diagnoses: Dilated cardiomyopathy [I42.0]  Ventricular premature depolarization [I49.3]  PVC (premature ventricular contraction) [I49.3]  Discharge Diagnoses: PVC, dilated cardiomyopathy    Discharged Condition: good    Hospital Course: The patient is a 68 y.o. male with a past medical history of AVR(repair at [facility] in [address] [facility], redo ), NICM, VT with multiple shocks, AICD second device--battery , HTN, and HLD     Freddy is very active. He plays golf and is able to mow the lawn without fatigue. His EF has decreased again to 30-35%. He has frequent PVC's on ECG.     Interval HX: Patient presented for generator change and VT/PVC ablation with Dr. Radhames Nunez. Uneventful post operative course. Left chest incision and right groin ablation site soft, no hematoma/ooozing/swelling noted. Up in chair, ambulating in room without issue. Patient seen and examined. Notes, labs, and recent testing reviewed. Telemetry reviewed, pt in NSR  No new complaints today and no major events overnight. Denies having chest pain, palpitations, shortness of breath, edema or dizziness at the time of this visit. Consults: none    Objective:   BP (!) 145/78   Pulse 75   Temp 96.2 °F (35.7 °C) (Temporal)   Resp 16   Ht 6' 1\" (1.854 m)   Wt 218 lb 7.6 oz (99.1 kg)   SpO2 97%   BMI 28.82 kg/m²      Intake/Output Summary (Last 24 hours) at 2019 1053  Last data filed at 2019 0843  Gross per 24 hour   Intake 240 ml   Output --   Net 240 ml     Physical Exam:  Telemetry: NSR with 1st degree  General: Alert & Oriented x4 in no distress  Skin: Warm and dry, no cyanosis or bruising.  Left chest incision soft, no

## 2019-08-14 NOTE — CARE COORDINATION
Patient admitted as OPIB with an anticipated short hospitalization length of stay. Chart reviewed and it appears that patient has minimal needs for discharge at this time. Discussed with patients nurse and requested that case management be notified if discharge needs are identified. *Case management will continue to follow progress and update discharge plan as needed.     Lisa Goel MSW, [address]

## 2019-08-14 NOTE — PROGRESS NOTES
Right groin soft, stable, without bleeding or complications noted. Distal pulses palpable. NSR on monitor   VSS  Denies CP or SOB    Will continue to monitor.

## 2019-08-21 ENCOUNTER — NURSE ONLY (OUTPATIENT)
Dept: CARDIOLOGY CLINIC | Age: 69
End: 2019-08-21
Payer: MEDICARE

## 2019-08-21 DIAGNOSIS — I42.0 DILATED CARDIOMYOPATHY (HCC): ICD-10-CM

## 2019-08-21 DIAGNOSIS — Z95.810 ICD (IMPLANTABLE CARDIOVERTER-DEFIBRILLATOR) IN PLACE: Primary | ICD-10-CM

## 2019-08-21 PROCEDURE — 93282 PRGRMG EVAL IMPLANTABLE DFB: CPT | Performed by: INTERNAL MEDICINE

## 2019-08-22 LAB
POC ACT LR: 146 SEC
POC ACT LR: 230 SEC
POC ACT LR: 283 SEC
POC ACT LR: 296 SEC
POC ACT LR: >400 SEC

## 2019-09-08 NOTE — PROGRESS NOTES
mouth daily  Yes Historical Provider, MD   aspirin 81 MG tablet Take 81 mg by mouth daily. Yes Historical Provider, MD      Past Medical History:  Past Medical History:   Diagnosis Date    Hypertension     Non-ischemic cardiomyopathy ([address]     Severe aortic stenosis      Past Surgical History:    has a past surgical history that includes hernia repair (Right, 2010); Aortic valvuloplasty (1996); Cardiac defibrillator placement (1996/ 2005); Aortic valve replacement (1/6/2016); Coronary artery bypass graft; and Cataract removal with implant (Left). Social History:  Reviewed. reports that he has never smoked. He has never used smokeless tobacco. He reports that he drinks about 3.0 - 4.0 standard drinks of alcohol per week. He reports that he does not use drugs. Family History:  Reviewed. Denies family history of sudden cardiac death, arrhythmia, premature CAD    Review of System:  · Constitutional: Negative for fever, night sweats, chills, weight changes, or weakness  · Skin: Negative for rash, dry skin, pruritus, bruising, bleeding, blood clots, or changes in skin pigment  · HEENT: Negative for vision changes, ringing in the ears, sore throat, dysphagia, or swollen lymph nodes  · Respiratory: Reviewed in HPI  · Cardiovascular: Reviewed in HPI  · Gastrointestinal: Negative for abdominal pain, N/V/D, constipation, or black/tarry stools  · Genito-Urinary: Negative for dysuria, incontinence, urgency, or hematuria  · Musculoskeletal: Negative for joint swelling, muscle pain, or injuries  · Neurological/Psych: Negative for confusion, seizures, dizziness, headaches, balance issues or TIA-like symptoms.  No anxiety, depression, or insomnia    Physical Examination:  Vitals:    09/20/19 1053   BP: 139/87   Pulse: 66   Resp: 18      Wt Readings from Last 3 Encounters:   09/20/19 225 lb (102.1 kg)   08/14/19 218 lb 7.6 oz (99.1 kg)   05/28/19 228 lb 6.4 oz (103.6 kg)     Constitutional: Cooperative and in no apparent

## 2019-09-08 NOTE — PATIENT INSTRUCTIONS
Plan:  1. Continue medications as prescribed  2. Will obtain echocardiogram on November 13th before his visit with Dr. Joshua Kinney  3. Keep exercising and golfing as tolerated    F/U: Follow-up with Dr. Pilar Lal as scheduled  - Follow up with Dr. Joshua Kinney as scheduled  -Call Gena  at [phone] with any questions    Diet & Exercise:   The patient is counseled to follow a low salt diet to assure blood pressure remains controlled for cardiovascular risk factor modification   The patient is counseled to avoid excess caffeine, and energy drinks as this may exacerbated ectopy and arrhythmia   The patient is counseled to lose weight to control cardiovascular risk factors   Exercise program discussed: To improve overall cardiovascular health, the patient is instructed to increase cardiovascular related activities with a goal of 150 min/week of moderate level activity or 10,000 steps per day.  Encouraged to perform as much activity as tolerated

## 2019-09-20 ENCOUNTER — OFFICE VISIT (OUTPATIENT)
Dept: CARDIOLOGY CLINIC | Age: 69
End: 2019-09-20
Payer: MEDICARE

## 2019-09-20 VITALS
HEART RATE: 66 BPM | BODY MASS INDEX: 29.82 KG/M2 | WEIGHT: 225 LBS | RESPIRATION RATE: 18 BRPM | SYSTOLIC BLOOD PRESSURE: 139 MMHG | HEIGHT: 73 IN | DIASTOLIC BLOOD PRESSURE: 87 MMHG

## 2019-09-20 DIAGNOSIS — E78.5 HYPERLIPIDEMIA, UNSPECIFIED HYPERLIPIDEMIA TYPE: ICD-10-CM

## 2019-09-20 DIAGNOSIS — I15.9 SECONDARY HYPERTENSION: ICD-10-CM

## 2019-09-20 DIAGNOSIS — I42.0 DILATED CARDIOMYOPATHY (HCC): ICD-10-CM

## 2019-09-20 DIAGNOSIS — I35.0 NONRHEUMATIC AORTIC VALVE STENOSIS: ICD-10-CM

## 2019-09-20 DIAGNOSIS — I49.3 PVC (PREMATURE VENTRICULAR CONTRACTION): ICD-10-CM

## 2019-09-20 DIAGNOSIS — Z45.02 ICD (IMPLANTABLE CARDIOVERTER-DEFIBRILLATOR) BATTERY DEPLETION: Primary | ICD-10-CM

## 2019-09-20 PROCEDURE — 99214 OFFICE O/P EST MOD 30 MIN: CPT | Performed by: NURSE PRACTITIONER

## 2019-09-20 PROCEDURE — 93000 ELECTROCARDIOGRAM COMPLETE: CPT | Performed by: NURSE PRACTITIONER

## 2019-11-13 ENCOUNTER — HOSPITAL ENCOUNTER (OUTPATIENT)
Dept: NON INVASIVE DIAGNOSTICS | Age: 69
Discharge: HOME | End: 2019-11-13
Payer: MEDICARE

## 2019-11-13 ENCOUNTER — TELEPHONE (OUTPATIENT)
Dept: CARDIOLOGY CLINIC | Age: 69
End: 2019-11-13

## 2019-11-13 ENCOUNTER — OFFICE VISIT (OUTPATIENT)
Dept: CARDIOLOGY CLINIC | Age: 69
End: 2019-11-13
Payer: MEDICARE

## 2019-11-13 VITALS
BODY MASS INDEX: 30.52 KG/M2 | HEART RATE: 68 BPM | SYSTOLIC BLOOD PRESSURE: 128 MMHG | HEIGHT: 73 IN | DIASTOLIC BLOOD PRESSURE: 80 MMHG | WEIGHT: 230.3 LBS

## 2019-11-13 DIAGNOSIS — I35.0 NONRHEUMATIC AORTIC VALVE STENOSIS: ICD-10-CM

## 2019-11-13 DIAGNOSIS — I42.0 DILATED CARDIOMYOPATHY (HCC): ICD-10-CM

## 2019-11-13 DIAGNOSIS — I35.9 AORTIC VALVE DISORDER: Chronic | ICD-10-CM

## 2019-11-13 DIAGNOSIS — E78.5 HYPERLIPIDEMIA, UNSPECIFIED HYPERLIPIDEMIA TYPE: ICD-10-CM

## 2019-11-13 DIAGNOSIS — I25.5 ISCHEMIC CARDIOMYOPATHY: Primary | ICD-10-CM

## 2019-11-13 DIAGNOSIS — I15.9 SECONDARY HYPERTENSION: ICD-10-CM

## 2019-11-13 DIAGNOSIS — I49.3 PVC (PREMATURE VENTRICULAR CONTRACTION): ICD-10-CM

## 2019-11-13 LAB
LV EF: 33 %
LVEF MODALITY: NORMAL

## 2019-11-13 PROCEDURE — 93306 TTE W/DOPPLER COMPLETE: CPT

## 2019-11-13 PROCEDURE — 1123F ACP DISCUSS/DSCN MKR DOCD: CPT | Performed by: INTERNAL MEDICINE

## 2019-11-13 PROCEDURE — G8417 CALC BMI ABV UP PARAM F/U: HCPCS | Performed by: INTERNAL MEDICINE

## 2019-11-13 PROCEDURE — 3017F COLORECTAL CA SCREEN DOC REV: CPT | Performed by: INTERNAL MEDICINE

## 2019-11-13 PROCEDURE — 99214 OFFICE O/P EST MOD 30 MIN: CPT | Performed by: INTERNAL MEDICINE

## 2019-11-13 PROCEDURE — G8598 ASA/ANTIPLAT THER USED: HCPCS | Performed by: INTERNAL MEDICINE

## 2019-11-13 PROCEDURE — 4040F PNEUMOC VAC/ADMIN/RCVD: CPT | Performed by: INTERNAL MEDICINE

## 2019-11-13 PROCEDURE — G8484 FLU IMMUNIZE NO ADMIN: HCPCS | Performed by: INTERNAL MEDICINE

## 2019-11-13 PROCEDURE — 1036F TOBACCO NON-USER: CPT | Performed by: INTERNAL MEDICINE

## 2019-11-13 PROCEDURE — G8427 DOCREV CUR MEDS BY ELIG CLIN: HCPCS | Performed by: INTERNAL MEDICINE

## 2019-11-13 RX ORDER — ASCORBIC ACID 500 MG
500 TABLET ORAL DAILY
COMMUNITY

## 2019-11-13 RX ORDER — LISINOPRIL 10 MG/1
10 TABLET ORAL DAILY
Qty: 90 TABLET | Refills: 3 | Status: CANCELLED | OUTPATIENT
Start: 2019-11-13

## 2019-11-18 ENCOUNTER — TELEPHONE (OUTPATIENT)
Dept: CARDIOLOGY CLINIC | Age: 69
End: 2019-11-18

## 2019-11-21 ENCOUNTER — NURSE ONLY (OUTPATIENT)
Dept: CARDIOLOGY CLINIC | Age: 69
End: 2019-11-21
Payer: MEDICARE

## 2019-11-21 ENCOUNTER — OFFICE VISIT (OUTPATIENT)
Dept: CARDIOLOGY CLINIC | Age: 69
End: 2019-11-21
Payer: MEDICARE

## 2019-11-21 VITALS
HEIGHT: 73 IN | BODY MASS INDEX: 30.09 KG/M2 | SYSTOLIC BLOOD PRESSURE: 131 MMHG | HEART RATE: 78 BPM | WEIGHT: 227 LBS | RESPIRATION RATE: 18 BRPM | DIASTOLIC BLOOD PRESSURE: 75 MMHG

## 2019-11-21 DIAGNOSIS — I25.5 ISCHEMIC CARDIOMYOPATHY: ICD-10-CM

## 2019-11-21 DIAGNOSIS — E78.2 MIXED HYPERLIPIDEMIA: ICD-10-CM

## 2019-11-21 DIAGNOSIS — I42.0 DILATED CARDIOMYOPATHY (HCC): ICD-10-CM

## 2019-11-21 DIAGNOSIS — I10 BENIGN ESSENTIAL HTN: ICD-10-CM

## 2019-11-21 DIAGNOSIS — R94.31 ECG ABNORMAL: ICD-10-CM

## 2019-11-21 DIAGNOSIS — Z95.810 ICD (IMPLANTABLE CARDIOVERTER-DEFIBRILLATOR) IN PLACE: ICD-10-CM

## 2019-11-21 DIAGNOSIS — I49.3 PVC (PREMATURE VENTRICULAR CONTRACTION): Primary | ICD-10-CM

## 2019-11-21 PROCEDURE — 99214 OFFICE O/P EST MOD 30 MIN: CPT | Performed by: INTERNAL MEDICINE

## 2019-11-21 PROCEDURE — 1123F ACP DISCUSS/DSCN MKR DOCD: CPT | Performed by: INTERNAL MEDICINE

## 2019-11-21 PROCEDURE — 93282 PRGRMG EVAL IMPLANTABLE DFB: CPT | Performed by: INTERNAL MEDICINE

## 2019-11-21 PROCEDURE — 1036F TOBACCO NON-USER: CPT | Performed by: INTERNAL MEDICINE

## 2019-11-21 PROCEDURE — G8598 ASA/ANTIPLAT THER USED: HCPCS | Performed by: INTERNAL MEDICINE

## 2019-11-21 PROCEDURE — G8484 FLU IMMUNIZE NO ADMIN: HCPCS | Performed by: INTERNAL MEDICINE

## 2019-11-21 PROCEDURE — 4040F PNEUMOC VAC/ADMIN/RCVD: CPT | Performed by: INTERNAL MEDICINE

## 2019-11-21 PROCEDURE — 3017F COLORECTAL CA SCREEN DOC REV: CPT | Performed by: INTERNAL MEDICINE

## 2019-11-21 PROCEDURE — G8417 CALC BMI ABV UP PARAM F/U: HCPCS | Performed by: INTERNAL MEDICINE

## 2019-11-21 PROCEDURE — G8427 DOCREV CUR MEDS BY ELIG CLIN: HCPCS | Performed by: INTERNAL MEDICINE

## 2020-01-02 RX ORDER — LISINOPRIL 10 MG/1
10 TABLET ORAL DAILY
Qty: 90 TABLET | Refills: 0 | Status: SHIPPED | OUTPATIENT
Start: 2020-01-02 | End: 2020-03-25 | Stop reason: SDUPTHER

## 2020-01-02 NOTE — TELEPHONE ENCOUNTER
RX APPROVAL:      Refill:   Requested Prescriptions      No prescriptions requested or ordered in this encounter      Last OV: 11/21/2019   Last EKG:   Last Labs: active future lab order on 11/13/19  Lab Results   Component Value Date    GLUCOSE 91 02/17/2016    BUN 16 02/17/2016    CREATININE 1.2 08/13/2019    CREATININE 1.1 02/17/2016    LABGLOM 60 08/13/2019     02/17/2016    K 4.5 02/17/2016     02/17/2016    CO2 28 02/17/2016    CALCIUM 9.3 02/17/2016     Lab Results   Component Value Date     02/17/2016     02/17/2016    CO2 28 02/17/2016    ANIONGAP 12 02/17/2016    GLUCOSE 91 02/17/2016    BUN 16 02/17/2016    CREATININE 1.2 08/13/2019    CREATININE 1.1 02/17/2016    LABGLOM 60 08/13/2019    GFRAA >60 08/13/2019    GFRAA >60 12/07/2009    CALCIUM 9.3 02/17/2016    PROT 7.3 02/17/2016    LABALBU 4.0 02/17/2016    BILITOT 0.6 02/17/2016    ALKPHOS 109 02/17/2016    AST 28 02/17/2016    ALT 32 02/17/2016    GLOB 3.3 02/17/2016     Lab Results   Component Value Date    ALT 32 02/17/2016    AST 28 02/17/2016     Lab Results   Component Value Date    K 4.5 02/17/2016       Plan and labs reviewed

## 2020-01-02 NOTE — TELEPHONE ENCOUNTER
Pt spoke with MALIK and advised him that he couldn't afford the [facility] and that he wanted to go back on the Lisinopril. DGB agreed, he now needs it to be refilled. Please send script to [facility] on [id number]  ph:[phone]. Please call pt to let him know that the script was sent. Thank you.

## 2020-01-03 RX ORDER — LISINOPRIL 10 MG/1
10 TABLET ORAL DAILY
Qty: 90 TABLET | Refills: 0 | OUTPATIENT
Start: 2020-01-03

## 2020-02-25 ENCOUNTER — NURSE ONLY (OUTPATIENT)
Dept: CARDIOLOGY CLINIC | Age: 70
End: 2020-02-25
Payer: MEDICARE

## 2020-02-25 PROCEDURE — 93296 REM INTERROG EVL PM/IDS: CPT | Performed by: INTERNAL MEDICINE

## 2020-02-25 PROCEDURE — 93295 DEV INTERROG REMOTE 1/2/MLT: CPT | Performed by: INTERNAL MEDICINE

## 2020-03-25 RX ORDER — LISINOPRIL 10 MG/1
10 TABLET ORAL DAILY
Qty: 90 TABLET | Refills: 3 | Status: SHIPPED | OUTPATIENT
Start: 2020-03-25 | End: 2021-03-29 | Stop reason: SDUPTHER

## 2020-03-25 NOTE — TELEPHONE ENCOUNTER
Medication Refill    Medication needing refilled: lisinopril (PRINIVIL;ZESTRIL) 10 MG tablet    Doseage of the medication: 10 mg    How are you taking this medication (QD, BID, TID, QID, PRN): 1 tab daily    30 or 90 day supply called in: 80    Which Pharmacy are we sending the medication to?: [facility], [address] [place] [address]

## 2020-05-21 ENCOUNTER — VIRTUAL VISIT (OUTPATIENT)
Dept: CARDIOLOGY CLINIC | Age: 70
End: 2020-05-21
Payer: MEDICARE

## 2020-05-21 VITALS
SYSTOLIC BLOOD PRESSURE: 115 MMHG | HEART RATE: 68 BPM | DIASTOLIC BLOOD PRESSURE: 72 MMHG | WEIGHT: 220 LBS | HEIGHT: 73 IN | BODY MASS INDEX: 29.16 KG/M2

## 2020-05-21 PROCEDURE — 99213 OFFICE O/P EST LOW 20 MIN: CPT | Performed by: INTERNAL MEDICINE

## 2020-05-21 RX ORDER — CARVEDILOL 3.12 MG/1
3.12 TABLET ORAL 2 TIMES DAILY WITH MEALS
Qty: 180 TABLET | Refills: 3 | Status: SHIPPED | OUTPATIENT
Start: 2020-05-21 | End: 2020-09-16 | Stop reason: SDUPTHER

## 2020-05-21 SDOH — HEALTH STABILITY: MENTAL HEALTH: HOW MANY STANDARD DRINKS CONTAINING ALCOHOL DO YOU HAVE ON A TYPICAL DAY?: 1 OR 2

## 2020-05-21 SDOH — HEALTH STABILITY: MENTAL HEALTH: HOW OFTEN DO YOU HAVE A DRINK CONTAINING ALCOHOL?: 2-3 TIMES A WEEK

## 2020-05-21 ASSESSMENT — ENCOUNTER SYMPTOMS
ALLERGIC/IMMUNOLOGIC NEGATIVE: 1
GASTROINTESTINAL NEGATIVE: 1
SHORTNESS OF BREATH: 1

## 2020-05-21 NOTE — PROGRESS NOTES
2020    TELEHEALTH EVALUATION -- Audio/Visual (During GEIPN-45 public health emergency)  Chief Complaint   Patient presents with    6 Month Follow-Up    Hypertension    Hyperlipidemia         HPI:    Guerita Gamble (:  1950) has requested an audio evaluation for the following concern(s):    Mr Jordy Lunsford is a  65 y.o.male seen as a follow up for  AVR(repair at  [facility], redo ) cardiomyopathy(AICD--gen change 19)htn, hchol     Today, he is doing well. He states he has no chest pain, change in exertional sob, palpitations dizziness. Thinks he has more energy since his ablation. No syncope. No shocks    Patient is compliant  with medication and is tolerating them well without side effects Patient has been advised on the importance of regular exercise of at least 20-30 minutes daily alternating with aerobic and isometric activities. Review of Systems   HENT: Negative. Cardiovascular: Negative. Respiratory: Positive for shortness of breath. Gastrointestinal: Negative. Allergic/Immunologic: Negative. All other systems reviewed and are negative. Prior to Visit Medications    Medication Sig Taking? Authorizing Provider   carvedilol (COREG) 3.125 MG tablet Take 1 tablet by mouth 2 times daily (with meals) Yes Rachael Rogers MD   lisinopril (PRINIVIL;ZESTRIL) 10 MG tablet Take 1 tablet by mouth daily Yes Rachael Rogers MD   vitamin C (ASCORBIC ACID) 500 MG tablet Take 500 mg by mouth daily Yes Historical Provider, MD   vitamin E 400 UNIT capsule Take 400 Units by mouth daily Yes Historical Provider, MD   Cholecalciferol (VITAMIN D PO) Take 1,000 Units by mouth daily  Yes Historical Provider, MD   aspirin 81 MG tablet Take 81 mg by mouth daily. Yes Historical Provider, MD       Social History     Tobacco Use    Smoking status: Never Smoker    Smokeless tobacco: Never Used   Substance Use Topics    Alcohol use:  Yes     Alcohol/week: 1.0 - 2.0 standard Act, 1135 waiver authority and the Coronavirus Preparedness and Response Supplemental Appropriations Act, this Virtual Visit was conducted with patient's (and/or legal guardian's) consent, to reduce the patient's risk of exposure to COVID-19 and provide necessary medical care. The patient (and/or legal guardian) has also been advised to contact this office for worsening conditions or problems, and seek emergency medical treatment and/or call 911 if deemed necessary. Patient identification was verified at the start of the visit: Yes    Total time spent on this encounter: Not billed by time    Services were provided through a video synchronous discussion virtually to substitute for in-person clinic visit. Patient and provider were located at their individual homes. Scribe Attestation:  Celestino Brower am scribing for and in the presence of Amrita Mtz MD.   Yamilka Garcia 05/21/20 11:01 AM   Provider Teri Rosado is working as a scribe for and in the presence of jeremy Mtz MD). Working as a scribe, Noe Marks may have prepopulated components of this note with my historical  intellectual property under my direct supervision. Any additions to this intellectual property were performed in my presence and at my direction. Furthermore, the content and accuracy of this note have been reviewed by jeremy Mtz MD). Dr Davidson Cruz MD  on 5/21/2020 at 11:01 AM    An electronic signature was used to authenticate this note.

## 2020-05-27 NOTE — PROGRESS NOTES
[facility]   Electrophysiology Follow Up  Date: 6/3/2020      CC: Cardiomyopathy/PVC's   HPI: López Silvestre is a 65 y.o. male who has a history of AVR(repair at [facility] [id number], redo1/16)cardiomyopathy, (AICD second device--battery , was turned off, at the time  His EF was  45%), HTN, HLD. He is s/p PVC ablation and single chamber ICD Gen change 19. Interval History:  Ruby Becerra presents to the office in follow up. He is feeling well today. He does not notice that he continues to have some PVC's. He had an echo today prior to his appointment which reads EF of 25 %. 6/3/2020 echo EF 25 % but upon further visualization the EF appears unchanged from 2019. We discussed that he may need to upgrade his device as previously discussed if he has decrease in EF or becomes symptomatic. Past Medical History:   Diagnosis Date    Hypertension     Non-ischemic cardiomyopathy ([address]     Severe aortic stenosis         Past Surgical History:   Procedure Laterality Date    AORTIC VALVE REPLACEMENT  2016    Dr. Ang James - 27 mm Mosaic Ultra porcine valve #[id number]    AORTIC VALVULOPLASTY      AV repair @ [facility]  [phone]     for CM/ now turned off does not need    CATARACT REMOVAL WITH IMPLANT Left     CORONARY ARTERY BYPASS GRAFT      HERNIA REPAIR Right 2010       Allergies:  No Known Allergies    Social History:   reports that he has never smoked. He has never used smokeless tobacco. He reports current alcohol use of about 1.0 - 2.0 standard drinks of alcohol per week. He reports that he does not use drugs. Family History:     Reviewed. Denies family history of sudden cardiac death, arrhythmia, premature CAD    Review of System:  All other systems reviewed and are negative except for that noted above.  Pertinent negatives are:   General: negative for fever, chills   Ophthalmic ROS: negative for - eye pain or loss of vision  ENT ROS: negative for - headaches, sore throat   Respiratory: negative for - cough, sputum  Cardiovascular: Reviewed in HPI  Gastrointestinal: negative for - abdominal pain, diarrhea, N/V  Hematology: negative for - bleeding, blood clots, bruising or jaundice  Genito-Urinary:  negative for - Dysuria or incontinence  Musculoskeletal: negative for - Joint swelling, muscle pain  Neurological: negative for - confusion, dizziness, headaches   Psychiatric: No anxiety, no depression. Dermatological: negative for - rash    Physical Examination:  Vitals:    20 1404   BP: 128/84   Pulse: 67      Wt Readings from Last 3 Encounters:   20 226 lb (102.5 kg)   20 220 lb (99.8 kg)   19 227 lb (103 kg)       · Constitutional: Oriented. No distress. · Head: Normocephalic and atraumatic. · Mouth/Throat: Oropharynx is clear and moist.   · Eyes: Conjunctivae normal. EOM are normal.   · Neck: Neck supple. No rigidity. No JVD present. · Cardiovascular: Normal rate, regular rhythm, S1&S2. · Pulmonary/Chest: Bilateral respiratory sounds. No wheezes, No rhonchi. · Abdominal: Soft. Bowel sounds present. No distension, No tenderness. · Musculoskeletal: No tenderness. No edema    · Lymphadenopathy: Has no cervical adenopathy. · Neurological: Alert and oriented. Cranial nerve appears intact, No Gross deficit   · Skin: Skin is warm and dry. No rash noted. · Psychiatric: Has a normal behavior       Labs, diagnostic and imaging results reviewed. Reviewed. Lab Results   Component Value Date    CREATININE 1.2 2019    CREATININE 1.1 2016    CREATININE 0.9 2016    AST 28 2016    ALT 32 2016       EC/3/20  SR frequent PVC's    Echo 6/3/2020  Summary   -Left ventricular cavity size is mildly dilated with borderline concentric   left ventricular hypertrophy.   -Overall left ventricular systolic function appears severely reduced with an   ejection fraction in the 25% range.   6/3/2020 echo EF 25 % but upon further visualization the EF appears unchanged from 11/2019   -There is moderate to severe diffuse hypokinesis with akinesis of the apex. -Grade II diastolic dysfunction with elevated LV filling   pressures. E/e\"=13.9.   -Mild mitral regurgitation.   -A bioprosthetic artificial aortic valve appears well seated with a maximum   velocity of 3.6m/s and a mean gradient of 31mmHg. This suggests at least   moderate stenosis. This is fairly similar to prior studies.   -Trivial aortic regurgitation.   -Mild tricuspid regurgitation.   -Estimated pulmonary artery systolic pressure is mildly elevated at 35 mmHg   assuming a right atrial pressure of 3 mmHg. -The left atrium is dilated. -Pacemaker / ICD lead is visualized in the right heart. Echo 11/13/19  Summary   -Global left ventricular function is moderately decreased with ejection   fraction estimated from 30 % to 35 %. E/e'= 12.25   -There is moderate diffuse hypokinesis.   -Indeterminate diastolic function. -Mild mitral regurgitation is present.   -A bioprosthetic artificial aortic valve appears well seated with a maximum   velocity of 3.71m/s and a mean gradient of 27mmHg. -Mild aortic regurgitation. P1/2t= 724   -Mild tricuspid regurgitation. PASP = 34 mmHg. Echo: 2/7/19  Summary   -Left ventricular cavity size is moderately dilated. Normal left ventricular   wall thickness. Overall left ventricular systolic function appears severely   reduced. There is severe diffuse hypokinesis. Ejection fraction is visually   estimated to be 30-35%.   -Grade II diastolic dysfunction with elevated LV filling pressures.   E/e\"=27.25   -A porcine aortic valve appears well seated with a maximum gradient of 44   mmHg and a mean gradient of 26 mmHg.  Mild aortic regurgitation.   -Mild mitral regurgitation is present.   -There is mild tricuspid regurgitation with RVSP estimated at 32 mmHg.   -Pacemaker / ICD lead was visualized in the right heart. Cath:     Medication:  Current Outpatient Medications   Medication Sig Dispense Refill    carvedilol (COREG) 3.125 MG tablet Take 1 tablet by mouth 2 times daily (with meals) 180 tablet 3    lisinopril (PRINIVIL;ZESTRIL) 10 MG tablet Take 1 tablet by mouth daily 90 tablet 3    vitamin C (ASCORBIC ACID) 500 MG tablet Take 500 mg by mouth daily      vitamin E 400 UNIT capsule Take 400 Units by mouth daily      Cholecalciferol (VITAMIN D PO) Take 1,000 Units by mouth daily       aspirin 81 MG tablet Take 81 mg by mouth daily. No current facility-administered medications for this visit. Assessment and plan: Aortic valve disorder/Aortic Valve repair 1996, redo 1/16   Acceptable function by echo    Nonischemic cardiomyopathy  5/20/16 echo EF 25%  2/7/19  Echo  EF 30-35%  11/13/19 echo EF 30-35 %  He has RBBB 186  But NYHA [place]. Therefore , I doubt Cardiac resynchronization therapy(CRT)  Is helpful at this time but will keep monitoring especially if symptoms worsen or EF drops. He has frequent PVC on ECG  24 hour holter 2/2019 PVC burden 32.1%  S/p PVC ablation and single chamber ICD Gen change 8/13/19  6/3/2020 echo EF 25 % but upon further visualization (myself) the EF appears unchanged from 11/2019  -Will continue to monitor his symptoms and if he has a decline in his status we will do the device upgrade  - 6 months with OV and echo      Single chamber ICD (Gen change 8/13/19)  The CIED was interrogated and programmed and I supervised and reviewed all the data.  All findings and changes are in device interrogation sheat and reflect my personal interpretation and changes and is scanned to Epic.    <0.1%, 11 years remaining battery, PVC's 85/hour on today's interrogation      HTN   Home BP monitoring encourage with a BP goal <130/80  Vitals:    06/03/20 1404   BP: 128/84   Pulse: 67          HLD  On statin     PVC's--  ECG today shows SR with PVC  1st degrees AV block, RBBB left axis bifasicular block,multiple pvc's, trigeminal pvc's   2 morphologies but a dominant one has RBBB and superior axis  24 hour holter 2/2019 PVC burden 32.1%  S/p PVC ablation 8/13/19. Felt better afterwards  85/hour on today's interrogation (compared to 94 last time)      Plan:    - 6 months with OV and echo    If any change in symptoms or EF, will consider Cardiac resynchronization therapy(CRT)  upgrade    Thank you for allowing me to participate in the care of [address] [place] [address]. Further evaluation will be based upon the patient's clinical course and testing results. All questions and concerns were addressed to the patient/family. Alternatives to my treatment were discussed. I have discussed the above stated plan and the patient verbalized understanding and agreed with the plan. NOTE: This report was transcribed using voice recognition software. Every effort was made to ensure accuracy, however, inadvertent computerized transcription errors may be present. Norm Ernandez MD, [address] [facility] [place]   Office: [phone]      Scribe attestation:  This note was scribed in the presence of Norm Ernandez MD by Solis Bah RN    I, Dr. Norm Ernandez personally performed the services described in this documentation as scribed by RN in my presence, and it is both accurate and complete.

## 2020-06-03 ENCOUNTER — HOSPITAL ENCOUNTER (OUTPATIENT)
Dept: NON INVASIVE DIAGNOSTICS | Age: 70
Discharge: HOME | End: 2020-06-03
Payer: MEDICARE

## 2020-06-03 ENCOUNTER — NURSE ONLY (OUTPATIENT)
Dept: CARDIOLOGY CLINIC | Age: 70
End: 2020-06-03
Payer: MEDICARE

## 2020-06-03 ENCOUNTER — OFFICE VISIT (OUTPATIENT)
Dept: CARDIOLOGY CLINIC | Age: 70
End: 2020-06-03
Payer: MEDICARE

## 2020-06-03 VITALS
DIASTOLIC BLOOD PRESSURE: 84 MMHG | BODY MASS INDEX: 29.95 KG/M2 | HEART RATE: 67 BPM | HEIGHT: 73 IN | WEIGHT: 226 LBS | SYSTOLIC BLOOD PRESSURE: 128 MMHG

## 2020-06-03 LAB
LEFT VENTRICULAR EJECTION FRACTION MODE: NORMAL
LV EF: 25 %
LV EF: 25 %
LVEF MODALITY: NORMAL

## 2020-06-03 PROCEDURE — 93282 PRGRMG EVAL IMPLANTABLE DFB: CPT | Performed by: INTERNAL MEDICINE

## 2020-06-03 PROCEDURE — 99214 OFFICE O/P EST MOD 30 MIN: CPT | Performed by: INTERNAL MEDICINE

## 2020-06-03 PROCEDURE — 93306 TTE W/DOPPLER COMPLETE: CPT

## 2020-06-03 PROCEDURE — 93000 ELECTROCARDIOGRAM COMPLETE: CPT | Performed by: INTERNAL MEDICINE

## 2020-07-08 ASSESSMENT — ENCOUNTER SYMPTOMS
BLOOD IN STOOL: 0
COUGH: 0
SINUS PAIN: 0
SORE THROAT: 0
BACK PAIN: 0
SHORTNESS OF BREATH: 0
CONSTIPATION: 0
ABDOMINAL PAIN: 0
WHEEZING: 0
VOMITING: 0
DIARRHEA: 0
NAUSEA: 0

## 2020-07-08 NOTE — PROGRESS NOTES
New Patient Office Visit   7/9/2020    Subjective:  Chief Complaint   Patient presents with    Establish Care     No concerns/complaints - Old pcp Dr. Milagro Suarez     HPI:   Evan Teague is a 65 y.o. male who presents to the clinic today to establish care. HTN/Cardiomyopathy/aortic stenosis- sees cardiology- Dr. Walter King and Dr. Meryle Deis. Takes Coreg twice daily and lisinopril daily. No side effects. Blood pressure stable. No chest pain, palpitations, shortness of breath, trouble breathing, lightheadedness, dizziness or blurred vision. Elevated LDL- Focuses on diet with his wife. Golfing three times per week. Vit D deficiency- Takes supplements daily. Asymptomatic. Last colonoscopy was greater than 10 years ago. Asymptomatic. Not interested in colonoscopy. No acute concerns. 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 Then  [employer]. . 2 sons. 2 grandchildren. For fun, he enjoys golf. Review of Systems   Constitutional: Negative for chills, fatigue, fever and unexpected weight change. HENT: Negative for congestion, postnasal drip, sinus pain, sore throat and tinnitus. Respiratory: Negative for cough, shortness of breath and wheezing. Cardiovascular: Negative for chest pain, palpitations and leg swelling. Gastrointestinal: Negative for abdominal pain, blood in stool, constipation, diarrhea, nausea and vomiting. Genitourinary: Negative for dysuria, frequency, hematuria and urgency. Musculoskeletal: Negative for back pain, gait problem, myalgias and neck pain. Skin: Negative for pallor and rash. Neurological: Negative for dizziness, weakness, light-headedness, numbness and headaches. Psychiatric/Behavioral: Negative for behavioral problems, confusion, dysphoric mood, sleep disturbance and suicidal ideas. The patient is not nervous/anxious.       No Known Allergies     Family History   Problem Relation Age of Onset    Cancer Mother skin    Lung Cancer Father         passed at 57 y/o    Heart Disease Neg Hx     High Blood Pressure Neg Hx     High Cholesterol Neg Hx     Heart Attack Neg Hx     Stroke Neg Hx     Prostate Cancer Neg Hx      Current Outpatient Rx   Medication Sig Dispense Refill    carvedilol (COREG) 3.125 MG tablet Take 1 tablet by mouth 2 times daily (with meals) 180 tablet 3    lisinopril (PRINIVIL;ZESTRIL) 10 MG tablet Take 1 tablet by mouth daily 90 tablet 3    vitamin C (ASCORBIC ACID) 500 MG tablet Take 500 mg by mouth daily      vitamin E 400 UNIT capsule Take 400 Units by mouth daily      Cholecalciferol (VITAMIN D PO) Take 1,000 Units by mouth daily       aspirin 81 MG tablet Take 81 mg by mouth daily. Social History     Socioeconomic History    Marital status:      Spouse name: Not on file    Number of children: Not on file    Years of education: Not on file    Highest education level: Not on file   Occupational History    Not on file   Social Needs    Financial resource strain: Not on file    Food insecurity     Worry: Not on file     Inability: Not on file    Transportation needs     Medical: Not on file     Non-medical: Not on file   Tobacco Use    Smoking status: Never Smoker    Smokeless tobacco: Never Used   Substance and Sexual Activity    Alcohol use:  Yes     Alcohol/week: 1.0 - 2.0 standard drinks     Types: 1 - 2 Glasses of wine per week     Frequency: 2-3 times a week     Drinks per session: 1 or 2     Comment: weekly red wine     Drug use: No    Sexual activity: Yes     Partners: Female   Lifestyle    Physical activity     Days per week: Not on file     Minutes per session: Not on file    Stress: Not on file   Relationships    Social connections     Talks on phone: Not on file     Gets together: Not on file     Attends [employer] service: Not on file     Active member of club or organization: Not on file     Attends meetings of clubs or organizations: Not on file     Relationship status: Not on file    Intimate partner violence     Fear of current or ex partner: Not on file     Emotionally abused: Not on file     Physically abused: Not on file     Forced sexual activity: Not on file   Other Topics Concern    Not on file   Social History Narrative    - 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 Then  of [employer] . 2 sons. 2 grandchildren. For fun, he enjoys golf. Past Medical History:   Diagnosis Date    Elevated LDL cholesterol level     Hypertension     Non-ischemic cardiomyopathy (HCC)     Severe aortic stenosis      Patient Active Problem List   Diagnosis    Aortic valve disorder    [facility] [address] [address] [address]    ICD (implantable cardioverter-defibrillator) in place    Bruit    Aortic stenosis    Hyperlipidemia    Hypertension    PVC (premature ventricular contraction)    ICD (implantable cardioverter-defibrillator) battery depletion    Nonischemic cardiomyopathy ([facility]      Wt Readings from Last 3 Encounters:   07/09/20 224 lb (101.6 kg)   06/03/20 226 lb (102.5 kg)   05/21/20 220 lb (99.8 kg)     BP Readings from Last 3 Encounters:   07/09/20 138/88   06/03/20 128/84   05/21/20 115/72     PHQ-9 Total Score: 0 (7/9/2020 10:14 AM)    Objective/Physical Exam:  /88   Pulse 73   Temp 97.3 °F (36.3 °C) (Temporal)   Ht 6' 1\" (1.854 m)   Wt 224 lb (101.6 kg)   SpO2 98%   BMI 29.55 kg/m²   Body mass index is 29.55 kg/m². Physical Exam  Vitals signs reviewed. Constitutional:       General: He is not in acute distress. Appearance: He is well-developed. He is not diaphoretic. HENT:      Head: Normocephalic and atraumatic. Right Ear: Tympanic membrane and external ear normal.      Left Ear: Tympanic membrane and external ear normal.      Mouth/Throat:      Mouth: Mucous membranes are moist.      Pharynx: No oropharyngeal exudate.    Eyes:      Conjunctiva/sclera: Conjunctivae normal. Pupils: Pupils are equal, round, and reactive to light. Neck:      Thyroid: No thyromegaly. Cardiovascular:      Rate and Rhythm: Normal rate and regular rhythm. Pulmonary:      Effort: Pulmonary effort is normal. No respiratory distress. Breath sounds: Normal breath sounds. No wheezing or rales. Chest:      Chest wall: No tenderness. Abdominal:      General: Bowel sounds are normal. There is no distension. Palpations: Abdomen is soft. Tenderness: There is no abdominal tenderness. There is no guarding. Musculoskeletal: Normal range of motion. General: No tenderness or deformity. Lymphadenopathy:      Cervical: No cervical adenopathy. Skin:     General: Skin is warm and dry. Coloration: Skin is not pale. Findings: No erythema or rash. Neurological:      Mental Status: He is alert and oriented to person, place, and time. Coordination: Coordination normal.   Psychiatric:         Mood and Affect: Mood normal.       Assessment and Plan:  Shayy Honeycutt was seen today for establish care. Diagnoses and all orders for this visit:    Encounter to establish care with new doctor   - Had labs 10/2/2019- stable. - Will return in 10/2020 for labs and AWV    Essential hypertension/Primary cardiomyopathy (HCC)/Other hyperlipidemia   - Stable. Asymptomatic. Continue with cardiology   - Lifestyle modifications such as exercise, weight loss and healthy diet encouraged and reviewed with the pt. Vitamin D deficiency   - Takes OTC supplements daily. Screen for colon cancer  -    Patient not interested in colonoscopy. Risks versus benefits were reviewed. He is agreeable to a fit test.  - POCT Fecal Immunochemical Test (FIT); Future    Return in about 3 months (around 10/9/2020) for AWV and labs , or sooner if needed. Pt will call if symptoms worsen or fail to improve. All questions answered. Pt states no further questions or concerns at this time.    Electronically signed by: Farzad Real 07/09/20

## 2020-07-09 ENCOUNTER — OFFICE VISIT (OUTPATIENT)
Dept: INTERNAL MEDICINE CLINIC | Age: 70
End: 2020-07-09
Payer: MEDICARE

## 2020-07-09 VITALS
TEMPERATURE: 97.3 F | OXYGEN SATURATION: 98 % | WEIGHT: 224 LBS | HEART RATE: 73 BPM | BODY MASS INDEX: 29.69 KG/M2 | SYSTOLIC BLOOD PRESSURE: 138 MMHG | HEIGHT: 73 IN | DIASTOLIC BLOOD PRESSURE: 88 MMHG

## 2020-07-09 PROCEDURE — 99213 OFFICE O/P EST LOW 20 MIN: CPT | Performed by: NURSE PRACTITIONER

## 2020-07-09 ASSESSMENT — PATIENT HEALTH QUESTIONNAIRE - PHQ9
2. FEELING DOWN, DEPRESSED OR HOPELESS: 0
SUM OF ALL RESPONSES TO PHQ QUESTIONS 1-9: 0
SUM OF ALL RESPONSES TO PHQ9 QUESTIONS 1 & 2: 0
SUM OF ALL RESPONSES TO PHQ QUESTIONS 1-9: 0
1. LITTLE INTEREST OR PLEASURE IN DOING THINGS: 0

## 2020-07-28 DIAGNOSIS — Z12.11 SCREEN FOR COLON CANCER: ICD-10-CM

## 2020-07-28 LAB
CONTROL: NORMAL
HEMOCCULT STL QL: NEGATIVE

## 2020-09-08 ENCOUNTER — NURSE ONLY (OUTPATIENT)
Dept: CARDIOLOGY CLINIC | Age: 70
End: 2020-09-08
Payer: MEDICARE

## 2020-09-08 PROCEDURE — 93295 DEV INTERROG REMOTE 1/2/MLT: CPT | Performed by: INTERNAL MEDICINE

## 2020-09-08 PROCEDURE — 93296 REM INTERROG EVL PM/IDS: CPT | Performed by: INTERNAL MEDICINE

## 2020-09-08 NOTE — PROGRESS NOTES
We received remote transmission from patient's monitor at home. Transmission shows normal sensing and pacing function. EP physician will review. See interrogation under cardiology tab in the [address] [facility] [address] field for more details. Optivol is within normal range.

## 2020-09-17 RX ORDER — CARVEDILOL 3.12 MG/1
3.12 TABLET ORAL 2 TIMES DAILY WITH MEALS
Qty: 180 TABLET | Refills: 2 | Status: SHIPPED | OUTPATIENT
Start: 2020-09-17 | End: 2021-03-01

## 2020-09-17 NOTE — TELEPHONE ENCOUNTER
RX APPROVAL:      Refill:   Requested Prescriptions     Pending Prescriptions Disp Refills    carvedilol (COREG) 3.125 MG tablet 180 tablet 3     Sig: Take 1 tablet by mouth 2 times daily (with meals)      Last OV: 6/3/2020   Last EKG:   Last Labs:   Lab Results   Component Value Date    GLUCOSE 91 02/17/2016    BUN 16 02/17/2016    CREATININE 1.2 08/13/2019    CREATININE 1.1 02/17/2016    LABGLOM 60 08/13/2019     02/17/2016    K 4.5 02/17/2016     02/17/2016    CO2 28 02/17/2016    CALCIUM 9.3 02/17/2016     Lab Results   Component Value Date     02/17/2016     02/17/2016    CO2 28 02/17/2016    ANIONGAP 12 02/17/2016    GLUCOSE 91 02/17/2016    BUN 16 02/17/2016    CREATININE 1.2 08/13/2019    CREATININE 1.1 02/17/2016    LABGLOM 60 08/13/2019    GFRAA >60 08/13/2019    GFRAA >60 12/07/2009    CALCIUM 9.3 02/17/2016    PROT 7.3 02/17/2016    LABALBU 4.0 02/17/2016    BILITOT 0.6 02/17/2016    ALKPHOS 109 02/17/2016    AST 28 02/17/2016    ALT 32 02/17/2016    GLOB 3.3 02/17/2016     Lab Results   Component Value Date    ALT 32 02/17/2016    AST 28 02/17/2016     Lab Results   Component Value Date    K 4.5 02/17/2016       Plan and labs reviewed

## 2020-10-02 ASSESSMENT — LIFESTYLE VARIABLES
HAS A RELATIVE, FRIEND, DOCTOR, OR ANOTHER HEALTH PROFESSIONAL EXPRESSED CONCERN ABOUT YOUR DRINKING OR SUGGESTED YOU CUT DOWN: 0
AUDIT-C TOTAL SCORE: 0
HOW OFTEN DO YOU HAVE SIX OR MORE DRINKS ON ONE OCCASION: 0
AUDIT TOTAL SCORE: 2
HOW OFTEN DURING THE LAST YEAR HAVE YOU HAD A FEELING OF GUILT OR REMORSE AFTER DRINKING: 0
HOW MANY STANDARD DRINKS CONTAINING ALCOHOL DO YOU HAVE ON A TYPICAL DAY: 0
AUDIT TOTAL SCORE: 0
HAVE YOU OR SOMEONE ELSE BEEN INJURED AS A RESULT OF YOUR DRINKING: 0
HOW OFTEN DO YOU HAVE SIX OR MORE DRINKS ON ONE OCCASION: NEVER
HOW OFTEN DURING THE LAST YEAR HAVE YOU FOUND THAT YOU WERE NOT ABLE TO STOP DRINKING ONCE YOU HAD STARTED: 0
HOW OFTEN DURING THE LAST YEAR HAVE YOU HAD A FEELING OF GUILT OR REMORSE AFTER DRINKING: NEVER
HOW MANY STANDARD DRINKS CONTAINING ALCOHOL DO YOU HAVE ON A TYPICAL DAY: ONE OR TWO
HOW OFTEN DO YOU HAVE A DRINK CONTAINING ALCOHOL: TWO TO FOUR TIMES A MONTH
HAS A RELATIVE, FRIEND, DOCTOR, OR ANOTHER HEALTH PROFESSIONAL EXPRESSED CONCERN ABOUT YOUR DRINKING OR SUGGESTED YOU CUT DOWN: NO
HOW OFTEN DURING THE LAST YEAR HAVE YOU NEEDED AN ALCOHOLIC DRINK FIRST THING IN THE MORNING TO GET YOURSELF GOING AFTER A NIGHT OF HEAVY DRINKING: 0
HOW OFTEN DURING THE LAST YEAR HAVE YOU NEEDED AN ALCOHOLIC DRINK FIRST THING IN THE MORNING TO GET YOURSELF GOING AFTER A NIGHT OF HEAVY DRINKING: NEVER
HOW OFTEN DURING THE LAST YEAR HAVE YOU BEEN UNABLE TO REMEMBER WHAT HAPPENED THE NIGHT BEFORE BECAUSE YOU HAD BEEN DRINKING: NEVER
HOW OFTEN DO YOU HAVE A DRINK CONTAINING ALCOHOL: 2
HOW OFTEN DURING THE LAST YEAR HAVE YOU FOUND THAT YOU WERE NOT ABLE TO STOP DRINKING ONCE YOU HAD STARTED: NEVER
HOW OFTEN DURING THE LAST YEAR HAVE YOU BEEN UNABLE TO REMEMBER WHAT HAPPENED THE NIGHT BEFORE BECAUSE YOU HAD BEEN DRINKING: 0
HOW OFTEN DURING THE LAST YEAR HAVE YOU FAILED TO DO WHAT WAS NORMALLY EXPECTED FROM YOU BECAUSE OF DRINKING: NEVER
AUDIT-C TOTAL SCORE: 2
HAVE YOU OR SOMEONE ELSE BEEN INJURED AS A RESULT OF YOUR DRINKING: NO
HOW OFTEN DURING THE LAST YEAR HAVE YOU FAILED TO DO WHAT WAS NORMALLY EXPECTED FROM YOU BECAUSE OF DRINKING: 0

## 2020-10-02 ASSESSMENT — PATIENT HEALTH QUESTIONNAIRE - PHQ9
2. FEELING DOWN, DEPRESSED OR HOPELESS: 0
SUM OF ALL RESPONSES TO PHQ QUESTIONS 1-9: 0
SUM OF ALL RESPONSES TO PHQ QUESTIONS 1-9: 0
1. LITTLE INTEREST OR PLEASURE IN DOING THINGS: 0
SUM OF ALL RESPONSES TO PHQ9 QUESTIONS 1 & 2: 0

## 2020-10-09 ENCOUNTER — OFFICE VISIT (OUTPATIENT)
Dept: INTERNAL MEDICINE CLINIC | Age: 70
End: 2020-10-09
Payer: MEDICARE

## 2020-10-09 VITALS
WEIGHT: 220 LBS | BODY MASS INDEX: 29.16 KG/M2 | HEIGHT: 73 IN | TEMPERATURE: 96.7 F | HEART RATE: 58 BPM | SYSTOLIC BLOOD PRESSURE: 132 MMHG | DIASTOLIC BLOOD PRESSURE: 82 MMHG

## 2020-10-09 DIAGNOSIS — E78.49 OTHER HYPERLIPIDEMIA: ICD-10-CM

## 2020-10-09 DIAGNOSIS — E55.9 VITAMIN D DEFICIENCY: ICD-10-CM

## 2020-10-09 DIAGNOSIS — I10 ESSENTIAL HYPERTENSION: ICD-10-CM

## 2020-10-09 DIAGNOSIS — Z11.59 NEED FOR HEPATITIS C SCREENING TEST: ICD-10-CM

## 2020-10-09 DIAGNOSIS — R97.20 ABNORMAL PSA: ICD-10-CM

## 2020-10-09 LAB
A/G RATIO: 2 (ref 1.1–2.2)
ALBUMIN SERPL-MCNC: 4.3 G/DL (ref 3.4–5)
ALP BLD-CCNC: 80 U/L (ref 40–129)
ALT SERPL-CCNC: 15 U/L (ref 10–40)
ANION GAP SERPL CALCULATED.3IONS-SCNC: 8 MMOL/L (ref 3–16)
AST SERPL-CCNC: 21 U/L (ref 15–37)
BASOPHILS ABSOLUTE: 0.1 K/UL (ref 0–0.2)
BASOPHILS RELATIVE PERCENT: 1.2 %
BILIRUB SERPL-MCNC: 0.4 MG/DL (ref 0–1)
BUN BLDV-MCNC: 16 MG/DL (ref 7–20)
CALCIUM SERPL-MCNC: 8.8 MG/DL (ref 8.3–10.6)
CHLORIDE BLD-SCNC: 109 MMOL/L (ref 99–110)
CHOLESTEROL, FASTING: 169 MG/DL (ref 0–199)
CO2: 27 MMOL/L (ref 21–32)
CREAT SERPL-MCNC: 1.1 MG/DL (ref 0.8–1.3)
EOSINOPHILS ABSOLUTE: 0.2 K/UL (ref 0–0.6)
EOSINOPHILS RELATIVE PERCENT: 3.7 %
GFR AFRICAN AMERICAN: >60
GFR NON-AFRICAN AMERICAN: >60
GLOBULIN: 2.2 G/DL
GLUCOSE BLD-MCNC: 92 MG/DL (ref 70–99)
HCT VFR BLD CALC: 41.2 % (ref 40.5–52.5)
HDLC SERPL-MCNC: 52 MG/DL (ref 40–60)
HEMOGLOBIN: 13.7 G/DL (ref 13.5–17.5)
HEPATITIS C ANTIBODY INTERPRETATION: NORMAL
LDL CHOLESTEROL CALCULATED: 106 MG/DL
LYMPHOCYTES ABSOLUTE: 1.7 K/UL (ref 1–5.1)
LYMPHOCYTES RELATIVE PERCENT: 28.9 %
MCH RBC QN AUTO: 32.8 PG (ref 26–34)
MCHC RBC AUTO-ENTMCNC: 33.4 G/DL (ref 31–36)
MCV RBC AUTO: 98.2 FL (ref 80–100)
MONOCYTES ABSOLUTE: 0.5 K/UL (ref 0–1.3)
MONOCYTES RELATIVE PERCENT: 8.1 %
NEUTROPHILS ABSOLUTE: 3.4 K/UL (ref 1.7–7.7)
NEUTROPHILS RELATIVE PERCENT: 58.1 %
PDW BLD-RTO: 13 % (ref 12.4–15.4)
PLATELET # BLD: 135 K/UL (ref 135–450)
PMV BLD AUTO: 9.3 FL (ref 5–10.5)
POTASSIUM SERPL-SCNC: 4.7 MMOL/L (ref 3.5–5.1)
PROSTATE SPECIFIC ANTIGEN: 3.53 NG/ML (ref 0–4)
RBC # BLD: 4.19 M/UL (ref 4.2–5.9)
SODIUM BLD-SCNC: 144 MMOL/L (ref 136–145)
TOTAL PROTEIN: 6.5 G/DL (ref 6.4–8.2)
TRIGLYCERIDE, FASTING: 56 MG/DL (ref 0–150)
VITAMIN D 25-HYDROXY: 47 NG/ML
VLDLC SERPL CALC-MCNC: 11 MG/DL
WBC # BLD: 5.9 K/UL (ref 4–11)

## 2020-10-09 PROCEDURE — G0438 PPPS, INITIAL VISIT: HCPCS | Performed by: NURSE PRACTITIONER

## 2020-10-09 NOTE — PATIENT INSTRUCTIONS
Personalized Preventive Plan for Deborah Sheth - 10/9/2020  Medicare offers a range of preventive health benefits. Some of the tests and screenings are paid in full while other may be subject to a deductible, co-insurance, and/or copay. Some of these benefits include a comprehensive review of your medical history including lifestyle, illnesses that may run in your family, and various assessments and screenings as appropriate. After reviewing your medical record and screening and assessments performed today your provider may have ordered immunizations, labs, imaging, and/or referrals for you. A list of these orders (if applicable) as well as your Preventive Care list are included within your After Visit Summary for your review. Other Preventive Recommendations:    · A preventive eye exam performed by an eye specialist is recommended every 1-2 years to screen for glaucoma; cataracts, macular degeneration, and other eye disorders. · A preventive dental visit is recommended every 6 months. · Try to get at least 150 minutes of exercise per week or 10,000 steps per day on a pedometer . · Order or download the FREE \"Exercise & Physical Activity: Your Everyday Guide\" from [employer]. Call 1-366.881.1330 or search [employer] online. · You need 4261-9215 mg of calcium and 3602-5412 IU of vitamin D per day. It is possible to meet your calcium requirement with diet alone, but a vitamin D supplement is usually necessary to meet this goal.  · When exposed to the sun, use a sunscreen that protects against both UVA and UVB radiation with an SPF of 30 or greater. Reapply every 2 to 3 hours or after sweating, drying off with a towel, or swimming. · Always wear a seat belt when traveling in a car. Always wear a helmet when riding a bicycle or motorcycle.

## 2020-10-09 NOTE — PROGRESS NOTES
Medicare Annual Wellness Visit  Name: Tesfaye Ontiveros Date: 10/9/2020   MRN: [id number] Sex: Male   Age: 65 y.o. Ethnicity: Non-/Non    : 1950 Race: Jonas Patella is here for Medicare AWV    Screenings for behavioral, psychosocial and functional/safety risks, and cognitive dysfunction are all negative except as indicated below. These results, as well as other patient data from the [address] form, are documented in Flowsheets linked to this Encounter. No Known Allergies         Prior to Visit Medications    Medication Sig Taking? Authorizing Provider   carvedilol (COREG) 3.125 MG tablet Take 1 tablet by mouth 2 times daily (with meals) Yes Milton Devlin MD   lisinopril (PRINIVIL;ZESTRIL) 10 MG tablet Take 1 tablet by mouth daily Yes Milton Devlin MD   vitamin C (ASCORBIC ACID) 500 MG tablet Take 500 mg by mouth daily Yes Historical Provider, MD   vitamin E 400 UNIT capsule Take 400 Units by mouth daily Yes Historical Provider, MD   Cholecalciferol (VITAMIN D PO) Take 1,000 Units by mouth daily  Yes Historical Provider, MD   aspirin 81 MG tablet Take 81 mg by mouth daily.  Yes Historical Provider, MD         Past Medical History:   Diagnosis Date    Elevated LDL cholesterol level     Hypertension     Non-ischemic [facility])     Severe aortic stenosis      Past Surgical History:   Procedure Laterality Date    AORTIC VALVE REPLACEMENT  2016    Dr. Wilner Tomlinson - 27 mm Mosaic Ultra porcine valve #[id number]    AORTIC VALVULOPLASTY      AV repair @ [place] [facility]  [phone]     for CM/ now turned off does not need    CATARACT REMOVAL WITH IMPLANT Left     CORONARY ARTERY BYPASS GRAFT      HERNIA REPAIR Right 2010         Family History   Problem Relation Age of Onset    Cancer Mother         skin    Lung Cancer Father         passed at 57 y/o    Heart Disease Neg Hx     High Blood Pressure Neg Hx     High Cholesterol Neg Hx     Heart Attack Neg Hx     Stroke Neg Hx     Prostate Cancer Neg Hx      CareTeam (Including outside providers/suppliers regularly involved in providing care):   Patient Care Team:  AMY Chauhan CNP as PCP - General (Nurse Practitioner)  AMY Chauhan CNP as PCP - [facility]  Hezzie Lesch, MD as Consulting Physician (Cardiology)  Genevieve Javier MD as Consulting Physician (Electrophysiology)    Wt Readings from Last 3 Encounters:   10/09/20 220 lb (99.8 kg)   07/09/20 224 lb (101.6 kg)   06/03/20 226 lb (102.5 kg)     Vitals:    10/09/20 0734   BP: 132/82   Pulse: 58   Temp: 96.7 °F (35.9 °C)   TempSrc: Temporal   Weight: 220 lb (99.8 kg)   Height: 6' 1\" (1.854 m)     Body mass index is 29.03 kg/m². Based upon direct observation of the patient, evaluation of cognition reveals recent and remote memory intact. Physical Exam  Vitals signs reviewed. Constitutional:       General: He is not in acute distress. Appearance: He is well-developed. He is not diaphoretic. HENT:      Head: Normocephalic and atraumatic. Cardiovascular:      Rate and Rhythm: Normal rate and regular rhythm. Comments: defib in place  Pulmonary:      Effort: Pulmonary effort is normal. No respiratory distress. Breath sounds: Normal breath sounds. No wheezing or rales. Chest:      Chest wall: No tenderness. Abdominal:      General: Bowel sounds are normal.      Palpations: Abdomen is soft. Skin:     General: Skin is warm and dry. Neurological:      Mental Status: He is alert and oriented to person, place, and time. Coordination: Coordination normal.   Psychiatric:         Mood and Affect: Mood normal.       Patient's complete Health Risk Assessment and screening values have been reviewed and are found in Flowsheets.  The following problems were reviewed today and where indicated follow up appointments were made and/or referrals ordered. Positive Risk Factor Screenings with Interventions:       General Health and ACP:  General  In general, how would you say your health is?: Very Good  In the past 7 days, have you experienced any of the following?  New or Increased Pain, New or Increased Fatigue, Loneliness, Social Isolation, Stress or Anger?: None of These  Do you get the social and emotional support that you need?: Yes  Do you have a Living Will?: (!) No  Advance Directives     Power of [facility] Will ACP-Advance Directive ACP-Power of     Not on File [place] on 12/12/15 [address] [facility] Risk Interventions:  · No Living Will: Advance Care Planning addressed with patient today and handout provided to pt today    Personalized Preventive Plan   Current Health Maintenance Status  Immunization History   Administered Date(s) Administered    Influenza Virus Vaccine 10/24/2005, 10/09/2014    Influenza, High Dose (Fluzone 65 yrs and older) 11/06/2015, 11/30/2016, 09/18/2018    Influenza, High-dose, Quadv, 65 yrs +, IM (Fluzone) 09/12/2020    Influenza, Quadv, IM, PF (6 mo and older Fluzone, Flulaval, Fluarix, and 3 yrs and older Afluria) 10/03/2017, 10/02/2019    Pneumococcal Conjugate 13-valent ([id number]) 11/30/2016    Pneumococcal Polysaccharide ([id number]) 10/01/2006, 10/24/2013, 11/06/2015, 10/02/2019    Tdap (Boostrix, Adacel) 02/23/2016    Zoster Live (Zostavax) 11/19/2013      Health Maintenance   Topic Date Due    Hepatitis C screen  1950    Shingles Vaccine (2 of 3) 01/14/2014    Potassium monitoring  02/17/2017    Creatinine monitoring  02/17/2017    Annual Wellness Visit (AWV)  06/23/2019    Lipid screen  02/17/2021    Colon Cancer Screen FIT/FOBT  07/28/2021    DTaP/Tdap/Td vaccine (2 - Td) 02/23/2026    Flu vaccine  Completed    Pneumococcal 65+ years Vaccine  Completed    Hepatitis A vaccine  Aged Out    Hepatitis B vaccine  Aged Out    Hib vaccine  Aged C/ Alej[address] Sudha 19 Meningococcal (ACWY) vaccine  Aged Out     Recommendations for [employer] Due: see orders and patient instructions/AVS.    Recommended screening schedule for the next 5-10 years is provided to the patient in written form: see Patient Arabella Thao was seen today for medicare awv. Diagnoses and all orders for this visit:    Routine general medical examination at a health care facility    Essential hypertension  -     sees cardiology- Dr. Samy Kimball and Dr. Feli Monroe. Takes Coreg twice daily and lisinopril daily. No side effects. Blood pressure stable. No chest pain, palpitations, shortness of breath, trouble breathing, lightheadedness, dizziness or blurred vision.   - COMPREHENSIVE METABOLIC PANEL; Future  -     CBC Auto Differential; Future    Other hyperlipidemia  -    Focuses on diet. Golfing three times per week. - Lipid, Fasting; Future    Vitamin D deficiency  -     Takes supplements daily. Asymptomatic.   - VITAMIN D 25 HYDROXY; Future    Abnormal PSA  -     Had abnormal PSA in the past. Was evaluated by a specialist. Has had biopsies with negative results. - Asymptomatic.   - PSA, Prostatic Specific Antigen; Future    Need for hepatitis C screening test  -     HEPATITIS C ANTIBODY; Future    Return in 1 year (on 10/9/2021) for AWV/annual exam, or sooner if needed. All questions answered. Patient states no further questions or concerns at this time.     Electronically signed by: AMY Rock - BILL 10/09/20

## 2020-11-06 ENCOUNTER — APPOINTMENT (RX ONLY)
Dept: URBAN - METROPOLITAN AREA CLINIC 170 | Facility: CLINIC | Age: 70
Setting detail: DERMATOLOGY
End: 2020-11-06

## 2020-11-06 DIAGNOSIS — Z02.9 ENCOUNTER FOR ADMINISTRATIVE EXAMINATIONS, UNSPECIFIED: ICD-10-CM

## 2020-11-06 DIAGNOSIS — L72.8 OTHER FOLLICULAR CYSTS OF THE SKIN AND SUBCUTANEOUS TISSUE: ICD-10-CM

## 2020-11-06 DIAGNOSIS — L82.1 OTHER SEBORRHEIC KERATOSIS: ICD-10-CM

## 2020-11-06 PROBLEM — D23.5 OTHER BENIGN NEOPLASM OF SKIN OF TRUNK: Status: ACTIVE | Noted: 2020-11-06

## 2020-11-06 PROCEDURE — ? ADDITIONAL NOTES

## 2020-11-06 PROCEDURE — ? COUNSELING

## 2020-11-06 PROCEDURE — ? FULL BODY SKIN EXAM - DECLINED

## 2020-11-06 PROCEDURE — 99213 OFFICE O/P EST LOW 20 MIN: CPT

## 2020-11-06 ASSESSMENT — LOCATION DETAILED DESCRIPTION DERM
LOCATION DETAILED: LEFT MID-UPPER BACK
LOCATION DETAILED: RIGHT INFERIOR MEDIAL UPPER BACK

## 2020-11-06 ASSESSMENT — LOCATION SIMPLE DESCRIPTION DERM
LOCATION SIMPLE: LEFT UPPER BACK
LOCATION SIMPLE: RIGHT UPPER BACK

## 2020-11-06 ASSESSMENT — LOCATION ZONE DERM: LOCATION ZONE: TRUNK

## 2020-11-06 NOTE — HPI: SKIN LESION
What Type Of Note Output Would You Prefer (Optional)?: Bullet Format
How Severe Is Your Skin Lesion?: mild
Has Your Skin Lesion Been Treated?: not been treated
Is This A New Presentation, Or A Follow-Up?: Growths
Additional History: Cystic like nodules.

## 2020-12-08 NOTE — PROGRESS NOTES
[address]   Electrophysiology Follow Up  Date: 2020      CC: Cardiomyopathy/PVC's   HPI: Andrea King is a 65 y.o. male who has a history of AVR(repair at [facility] [id number], redo1/16)cardiomyopathy, (AICD second device--battery , was turned off, at the time  His EF was  45%), HTN, HLD. He is s/p PVC ablation and single chamber ICD Gen change 19. Interval History: Pt has started exercising on the elliptical. Patient denies lightheadedness, dizziness, chest pain, palpitations, orthopnea, edema, presyncope or syncope. He generally feels very well with no significant symptoms right now. Past Medical History:   Diagnosis Date    Elevated LDL cholesterol level     Hypertension     Non-ischemic cardiomyopathy ([address]     Severe aortic stenosis         Past Surgical History:   Procedure Laterality Date    AORTIC VALVE REPLACEMENT  2016    Dr. Booth Beam - 27 mm Mosaic Ultra porcine valve #[id number]    AORTIC VALVULOPLASTY      AV repair @ [facility]  [phone]     for CM/ now turned off does not need    CATARACT REMOVAL WITH IMPLANT Left     CORONARY ARTERY BYPASS GRAFT      HERNIA REPAIR Right 2010       Allergies:  No Known Allergies    Social History:   reports that he has never smoked. He has never used smokeless tobacco. He reports current alcohol use of about 1.0 - 2.0 standard drinks of alcohol per week. He reports that he does not use drugs. Family History:     Reviewed. Denies family history of sudden cardiac death, arrhythmia, premature CAD    Review of System:  All other systems reviewed and are negative except for that noted above.  Pertinent negatives are:   General: negative for fever, chills   Ophthalmic ROS: negative for - eye pain or loss of vision  ENT ROS: negative for - headaches, sore throat   Respiratory: negative for - cough, sputum  Cardiovascular: Reviewed in HPI  Gastrointestinal: negative for - hypokinesis with akinesis of the apex. -Grade II diastolic dysfunction with elevated LV filling   pressures. E/e\"=13.9.   -Mild mitral regurgitation.   -A bioprosthetic artificial aortic valve appears well seated with a maximum   velocity of 3.6m/s and a mean gradient of 31mmHg. This suggests at least   moderate stenosis. This is fairly similar to prior studies.   -Trivial aortic regurgitation.   -Mild tricuspid regurgitation.   -Estimated pulmonary artery systolic pressure is mildly elevated at 35 mmHg   assuming a right atrial pressure of 3 mmHg. -The left atrium is dilated. -Pacemaker / ICD lead is visualized in the right heart. Echo 11/13/19  Summary   -Global left ventricular function is moderately decreased with ejection   fraction estimated from 30 % to 35 %. E/e'= 12.25   -There is moderate diffuse hypokinesis.   -Indeterminate diastolic function. -Mild mitral regurgitation is present.   -A bioprosthetic artificial aortic valve appears well seated with a maximum   velocity of 3.71m/s and a mean gradient of 27mmHg. -Mild aortic regurgitation. P1/2t= 724   -Mild tricuspid regurgitation. PASP = 34 mmHg. Echo: 2/7/19  Summary   -Left ventricular cavity size is moderately dilated. Normal left ventricular   wall thickness. Overall left ventricular systolic function appears severely   reduced. There is severe diffuse hypokinesis. Ejection fraction is visually   estimated to be 30-35%.   -Grade II diastolic dysfunction with elevated LV filling pressures.   E/e\"=27.25   -A porcine aortic valve appears well seated with a maximum gradient of 44   mmHg and a mean gradient of 26 mmHg. Mild aortic regurgitation.   -Mild mitral regurgitation is present.   -There is mild tricuspid regurgitation with RVSP estimated at 32 mmHg.   -Pacemaker / ICD lead was visualized in the right heart.   Cath:     Medication:  Current Outpatient Medications   Medication Sig Dispense Refill    carvedilol (COREG) 3.125 MG BP goal <130/80  Vitals:    12/09/20 1329   BP: (!) 144/89   Pulse: 66   SpO2: 96%   - patient is checking at home. States SBP runs 115/70  - no changes  Continue monitoring blood pressure at home       HLD  On statin           Plan:    Follow up in 6 months    If any change in symptoms or EF, will consider Cardiac resynchronization therapy(CRT)  upgrade    Thank you for allowing me to participate in the care of  [facility]. Further evaluation will be based upon the patient's clinical course and testing results. All questions and concerns were addressed to the patient/family. Alternatives to my treatment were discussed. I have discussed the above stated plan and the patient verbalized understanding and agreed with the plan. NOTE: This report was transcribed using voice recognition software. Every effort was made to ensure accuracy, however, inadvertent computerized transcription errors may be present. Zach Gan MD, [address]   Office: [phone]  Scribe attestation:  This note was scribed in the presence of Zach Gan MD by Vicky Davila RN    I, Dr. Zach Gan personally performed the services described in this documentation as scribed by RN in my presence, and it is both accurate and complete.      .

## 2020-12-09 ENCOUNTER — NURSE ONLY (OUTPATIENT)
Dept: CARDIOLOGY CLINIC | Age: 70
End: 2020-12-09
Payer: MEDICARE

## 2020-12-09 ENCOUNTER — OFFICE VISIT (OUTPATIENT)
Dept: CARDIOLOGY CLINIC | Age: 70
End: 2020-12-09
Payer: MEDICARE

## 2020-12-09 VITALS
OXYGEN SATURATION: 96 % | HEIGHT: 73 IN | BODY MASS INDEX: 29.37 KG/M2 | DIASTOLIC BLOOD PRESSURE: 89 MMHG | WEIGHT: 221.6 LBS | HEART RATE: 66 BPM | SYSTOLIC BLOOD PRESSURE: 144 MMHG

## 2020-12-09 PROCEDURE — 93000 ELECTROCARDIOGRAM COMPLETE: CPT | Performed by: INTERNAL MEDICINE

## 2020-12-09 PROCEDURE — 93282 PRGRMG EVAL IMPLANTABLE DFB: CPT | Performed by: INTERNAL MEDICINE

## 2020-12-09 PROCEDURE — 99214 OFFICE O/P EST MOD 30 MIN: CPT | Performed by: INTERNAL MEDICINE

## 2020-12-10 NOTE — PROGRESS NOTES
Patient comes in for programming evaluation for his defibrillator. All sensing and pacing parameters are within normal range. Battery life 10.7 years   <0.1%. PVCs per hour 55.8. Runs per hour 12.9. Patient remains on Coreg. No changes need to be made at this time. Please see interrogation for more detail. Optivol is within normal range. Patient will see Dr. Marcos Sow today and follow up in 3 months in office or remotely.

## 2021-03-01 ENCOUNTER — OFFICE VISIT (OUTPATIENT)
Dept: CARDIOLOGY CLINIC | Age: 71
End: 2021-03-01
Payer: MEDICARE

## 2021-03-01 VITALS
OXYGEN SATURATION: 97 % | WEIGHT: 224 LBS | SYSTOLIC BLOOD PRESSURE: 158 MMHG | HEART RATE: 62 BPM | HEIGHT: 73 IN | DIASTOLIC BLOOD PRESSURE: 98 MMHG | BODY MASS INDEX: 29.69 KG/M2

## 2021-03-01 DIAGNOSIS — E78.49 OTHER HYPERLIPIDEMIA: ICD-10-CM

## 2021-03-01 DIAGNOSIS — Z95.810 ICD (IMPLANTABLE CARDIOVERTER-DEFIBRILLATOR) IN PLACE: ICD-10-CM

## 2021-03-01 DIAGNOSIS — I49.3 PVC (PREMATURE VENTRICULAR CONTRACTION): ICD-10-CM

## 2021-03-01 DIAGNOSIS — I10 ESSENTIAL HYPERTENSION: ICD-10-CM

## 2021-03-01 DIAGNOSIS — I35.9 AORTIC VALVE DISORDER: Primary | ICD-10-CM

## 2021-03-01 DIAGNOSIS — I42.8 NONISCHEMIC CARDIOMYOPATHY (HCC): ICD-10-CM

## 2021-03-01 PROCEDURE — 99214 OFFICE O/P EST MOD 30 MIN: CPT | Performed by: INTERNAL MEDICINE

## 2021-03-01 RX ORDER — CARVEDILOL 6.25 MG/1
6.25 TABLET ORAL 2 TIMES DAILY WITH MEALS
Qty: 180 TABLET | Refills: 3 | Status: SHIPPED | OUTPATIENT
Start: 2021-03-01 | End: 2021-09-13 | Stop reason: SDUPTHER

## 2021-03-01 ASSESSMENT — ENCOUNTER SYMPTOMS
SHORTNESS OF BREATH: 0
CHEST TIGHTNESS: 0
NAUSEA: 0
ABDOMINAL PAIN: 0
COUGH: 0
BLOOD IN STOOL: 0
ABDOMINAL DISTENTION: 0
PHOTOPHOBIA: 0

## 2021-03-01 NOTE — PATIENT INSTRUCTIONS
Recommend stopping vitamin E  Recommend lifting hand weights in addition to increase Walking on the elliptical   Continue all other medications  Recommend getting children screened for bicuspid aortic valve  Increase coreg to 6.25 twice a day

## 2021-03-01 NOTE — PROGRESS NOTES
[address]  3/1/21  Referring: Pan Armstrong NP    REASON FOR CONSULT/CHIEF COMPLAINT/HPI     Reason for visit/ Chief complaint  Follow up  AVR/Cardiomyopathy   HPI Kecia Okeefe is a 65 y.o. seen in follow up for management of AVR and bicuspid aortic valve, non-ischemic cardiomyopathy, PVC ablation, hypertension, hyperlipidemia. He had normal coronaries at the time of his angiogram.  Will be getting second covid vaccine tomorrow. Works with [employer] department 3 days a week    Today he is feeling well. He works out on the [employer] 20-30 minutes a day (2-3 miles). He does break out in a sweat, but has no chest pain, shortness of breath, palpitations or dizziness. At home his blood pressure is 909 systolic at rest. He is able to walk up one flight of steps without shortness of breath or chest pain. Sleeps on one pillow, occasionally snores. Does not feel fatigued. He states that his energy level is good. Patient is compliant with medications and is tolerating them well without side effects     HISTORY/ALLERGIES/ROS     MedHx:  has a past medical history of Elevated LDL cholesterol level, Hypertension, Non-ischemic cardiomyopathy ([address] and Severe aortic stenosis. SurgHx:  has a past surgical history that includes hernia repair (Right, 2010); Aortic valvuloplasty (1996); Cardiac defibrillator placement (1996/ 2005); Aortic valve replacement (1/6/2016); Coronary artery bypass graft; and Cataract removal with implant (Left). SocHx:  reports that he has never smoked. He has never used smokeless tobacco. He reports current alcohol use of about 1.0 - 2.0 standard drinks of alcohol per week. He reports that he does not use drugs. FamHx: No family history of premature coronary artery disease, sudden death, or aneurysm  Allergies: Patient has no known allergies. ROS:   Review of Systems   Constitutional: Negative for activity change, diaphoresis, fatigue and fever.    HENT: Negative for Pulse 2+ and symmetric   Throat Lips, mucosa, tongue normal Skin Color/text/turg nl, no rash/lesions   Neck S/S, TM, NT, no bruit Psych Nl mood and affect   Lung CTA-B, unlabored, no DTP     Ch wall NT, no deform       LABS and Imaging     Relevant and available CV data reviewed  Echo/MRI: 6/3/2020 -Left ventricular cavity size is mildly dilated with borderline concentric   left ventricular hypertrophy. -Overall left ventricular systolic function appears severely reduced with an   ejection fraction in the 25% range.  -There is moderate to severe diffuse hypokinesis with akinesis of the apex. -Grade II diastolic dysfunction with elevated LV filling  pressures. E/e\"=13.9.  -Mild mitral regurgitation.  -A bioprosthetic artificial aortic valve appears well seated with a maximum   velocity of 3.6m/s and a mean gradient of 31mmHg. This suggests at least  moderate stenosis. This is fairly similar to prior studies.  -Trivial aortic regurgitation.  -Mild tricuspid regurgitation.  -Estimated pulmonary artery systolic pressure is mildly elevated at 35 mmHg  assuming a right atrial pressure of 3 mmHg. -The left atrium is dilated. -Pacemaker / ICD lead is visualized in the right heart. Muga 2015  EF 36%  Cath:   normal coronaries  Holter: 2019  PVC burden 32.1 %  EK20 Sinus  Rhythm  -First degree A-V block Davida = 242 -Right bundle branch block with left axis -bifascicular block.  -Atypical T axis -possible acute process , ekg personally interpreted  Stress:none  abd ultrasound--2016 No AAA  Carotid dopplers- no significant disease  moderate Risk  moderate Complexity/Medical Decision Making  Outside records Reviewed  Labs Reviewed 10/9/2020  Tc 169 hdl 52 ldl 106 tri 56 alt 15 ast 21  k 4.7  Bun 16 creat 1.1  Prior Imaging, ekg, cath, echo reviewed when available  Medications reviewed  Old Notes reviewed  Management options discussed  ASSESSMENT AND PLAN     1.  History of aortic valve replacement x 2  - congenital bicuspid aortic valve  - repair at [facility], redo 1/16  - bioprosthetic  - 6/2020 gradient is 31 mmHg c/w at least moderate stenosis but fairly unchanged  Plan  - recommend screening echo for children given history of bicuspid valve  - recommend stopping vitamin E  - recommend prophylactic antibiotic prior dental procedure- amoxicillen 2 g  - continue aspirin  - echo next visit    2. Nonischemic Cardiomyopathy  -   NYHA class I, ACC C  -   on lisinopril and coreg  -  Developed hyperkalemia with entresto  Plan  - Increase coreg to 6.25 mg bid  - not on spironolactone due to history of hyperkalemia    3. Frequent PVCs/PVC ablation  -   8/13/2019 ablation ( Dr Blaine Montanez)  -   PVC burden decreased to 55/hour  Plan   - increase coreg  - follows with ep    4. Single chamber ICD  -  generator change 8/13/19  -  no shocks  - history of vf  Plan  - continue above  - instructed to avoid qtc prolonging medications, abx and supplements. Call with any med questions    4. Essential Hypertension  - on coreg 3.125 bid lisinopril 10 mg daily  - monitor and record blood pressure at home  Plan  - increase coreg to 6.25 bid  - call in one week with blood pressure readings    5. Mixed Hyperlipidemia  - 2016 lipitor recommended, but he wanted diet control  - not to goal   Plan  - will repeat Lipid liver panel  - prefers to control lipids with diet    Patient counseled on lifestyle modification, diet, and exercise. Recommend increase to 4 days a week on elliptical, and use hand weights    Follow Up:   6 months with echo    Dr. Feliz Console:  I, Deven Pak, am scribing for and in the presence of Yovani Fields MD.   Alvaro Board 03/01/21 7:00 PM   Physician Attestation  The scribe for and in the presence of jeremy Rogers, DO).   The scribe Isaura Schofield may have prepopulated components of this note with my historical intellectual property under my direct supervision. Any additions to this intellectual property were performed in my presence and at my direction. Furthermore, the content and accuracy of this note have been reviewed by me Blaise Brenner DO).   3/1/2021 8:13 PM

## 2021-03-15 ENCOUNTER — NURSE ONLY (OUTPATIENT)
Dept: CARDIOLOGY CLINIC | Age: 71
End: 2021-03-15
Payer: MEDICARE

## 2021-03-15 DIAGNOSIS — Z95.810 ICD (IMPLANTABLE CARDIOVERTER-DEFIBRILLATOR) IN PLACE: ICD-10-CM

## 2021-03-15 DIAGNOSIS — I42.8 NONISCHEMIC CARDIOMYOPATHY (HCC): ICD-10-CM

## 2021-03-15 PROCEDURE — 93296 REM INTERROG EVL PM/IDS: CPT | Performed by: INTERNAL MEDICINE

## 2021-03-15 PROCEDURE — 93295 DEV INTERROG REMOTE 1/2/MLT: CPT | Performed by: INTERNAL MEDICINE

## 2021-03-15 NOTE — Clinical Note
FYI. Patients routine remote check that checks ICD shows optivol is elevated. Report is under paceart tab in cardiology field. Thanks.

## 2021-03-16 NOTE — PROGRESS NOTES
We received remote transmission from patient's monitor at home. Transmission shows normal sensing and pacing function. EP physician will review. See interrogation under cardiology tab in the [address] [facility] [address] field for more details. Optivol is elevated. Will notify office staff.

## 2021-03-18 ENCOUNTER — TELEPHONE (OUTPATIENT)
Dept: CARDIOLOGY CLINIC | Age: 71
End: 2021-03-18

## 2021-03-18 NOTE — TELEPHONE ENCOUNTER
Told to call in b/p and weight . Tuesday 3/16 117/71 at 8pm , weight  219 next morning . Wants DKW to know he is going to the gym.

## 2021-03-18 NOTE — TELEPHONE ENCOUNTER
Dr Cruz Medicine reviewed above, patient has a good blood pressure and should keep trending weights. Instructed to call for any changes in how he feels.  Verbalized understanding

## 2021-03-26 ENCOUNTER — TELEPHONE (OUTPATIENT)
Dept: CARDIOLOGY CLINIC | Age: 71
End: 2021-03-26

## 2021-03-26 RX ORDER — LISINOPRIL 10 MG/1
10 TABLET ORAL DAILY
Qty: 90 TABLET | Refills: 3 | Status: CANCELLED | OUTPATIENT
Start: 2021-03-26

## 2021-03-26 NOTE — TELEPHONE ENCOUNTER
Received refill request for Lisinopril     Last OV: 03/01/2021 wCheyenne DWYER     Last Refill: 03/25/2020 #90 w/ 3 refills     Next Appointment: 09/13/2021 nayan DWYER

## 2021-03-26 NOTE — TELEPHONE ENCOUNTER
Medication Refill    Medication needing refilled: lisinopril     Dosage of the medication:    How are you taking this medication (QD, BID, TID, QID, PRN):    30 or 90 day supply called in:    When will you run out of your medication: week     Which Pharmacy are we sending the medication to?:[facility] [address]

## 2021-03-29 RX ORDER — LISINOPRIL 10 MG/1
10 TABLET ORAL DAILY
Qty: 90 TABLET | Refills: 3 | Status: SHIPPED | OUTPATIENT
Start: 2021-03-29 | End: 2021-09-13 | Stop reason: SDUPTHER

## 2021-06-09 NOTE — PROGRESS NOTES
[address]   Electrophysiology Follow Up  Date: 6/10/2021      CC: Cardiomyopathy/PVC's   HPI: Latoya Ramos is a 66 y.o. male who has a history of AVR(repair at [facility] [id number], redo1/16)cardiomyopathy, (AICD second device--battery , was turned off, at the time  His EF was  45%), HTN, HLD. He is s/p PVC ablation and single chamber ICD Gen change 19. Interval History: Micha Cortez presents today  In routine follow up. He states he \" feels great\". He walks his front yard to [place] his yard and he works out in [employer] . Past Medical History:   Diagnosis Date    Elevated LDL cholesterol level     Hypertension     Non-ischemic cardiomyopathy ([address]     Severe aortic stenosis         Past Surgical History:   Procedure Laterality Date    AORTIC VALVE REPLACEMENT  2016    Dr. Lonnie Villareal - 27 mm Mosaic Ultra porcine valve #[id number]    AORTIC VALVULOPLASTY      AV repair @ [facility]  [phone]     for CM/ now turned off does not need    CATARACT REMOVAL WITH IMPLANT Left     CORONARY ARTERY BYPASS GRAFT      HERNIA REPAIR Right 2010       Allergies:  No Known Allergies    Social History:   reports that he has never smoked. He has never used smokeless tobacco. He reports current alcohol use of about 1.0 - 2.0 standard drinks of alcohol per week. He reports that he does not use drugs. Family History:     Reviewed. Denies family history of sudden cardiac death, arrhythmia, premature CAD    Review of System:  All other systems reviewed and are negative except for that noted above.  Pertinent negatives are:   General: negative for fever, chills   Ophthalmic ROS: negative for - eye pain or loss of vision  ENT ROS: negative for - headaches, sore throat   Respiratory: negative for - cough, sputum  Cardiovascular: Reviewed in HPI  Gastrointestinal: negative for - abdominal pain, diarrhea, N/V  Hematology: negative for - bleeding, blood clots, bruising -Grade II diastolic dysfunction with elevated LV filling   pressures. E/e\"=13.9.   -Mild mitral regurgitation.   -A bioprosthetic artificial aortic valve appears well seated with a maximum   velocity of 3.6m/s and a mean gradient of 31mmHg. This suggests at least   moderate stenosis. This is fairly similar to prior studies.   -Trivial aortic regurgitation.   -Mild tricuspid regurgitation.   -Estimated pulmonary artery systolic pressure is mildly elevated at 35 mmHg   assuming a right atrial pressure of 3 mmHg. -The left atrium is dilated. -Pacemaker / ICD lead is visualized in the right heart. Echo 11/13/19  Summary   -Global left ventricular function is moderately decreased with ejection   fraction estimated from 30 % to 35 %. E/e'= 12.25   -There is moderate diffuse hypokinesis.   -Indeterminate diastolic function. -Mild mitral regurgitation is present.   -A bioprosthetic artificial aortic valve appears well seated with a maximum   velocity of 3.71m/s and a mean gradient of 27mmHg. -Mild aortic regurgitation. P1/2t= 724   -Mild tricuspid regurgitation. PASP = 34 mmHg. Echo: 2/7/19  Summary   -Left ventricular cavity size is moderately dilated. Normal left ventricular   wall thickness. Overall left ventricular systolic function appears severely   reduced. There is severe diffuse hypokinesis. Ejection fraction is visually   estimated to be 30-35%.   -Grade II diastolic dysfunction with elevated LV filling pressures.   E/e\"=27.25   -A porcine aortic valve appears well seated with a maximum gradient of 44   mmHg and a mean gradient of 26 mmHg. Mild aortic regurgitation.   -Mild mitral regurgitation is present.   -There is mild tricuspid regurgitation with RVSP estimated at 32 mmHg.   -Pacemaker / ICD lead was visualized in the right heart.   Cath:     Medication:  Current Outpatient Medications   Medication Sig Dispense Refill    lisinopril (PRINIVIL;ZESTRIL) 10 MG tablet Take 1 tablet by mouth daily 90 tablet 3    carvedilol (COREG) 6.25 MG tablet Take 1 tablet by mouth 2 times daily (with meals) 180 tablet 3    vitamin C (ASCORBIC ACID) 500 MG tablet Take 500 mg by mouth daily      Cholecalciferol (VITAMIN D PO) Take 1,000 Units by mouth daily       aspirin 81 MG tablet Take 81 mg by mouth daily. No current facility-administered medications for this visit. Assessment and plan: Aortic valve disorder/Aortic Valve repair 1996, redo 1/16   Gradient is 31 mmHg consistent with at least moderate stenosis but  fairly unchanged from his previous echoes. Bioprosthetic valve is otherwise functioning normally. Nonischemic cardiomyopathy   S/p PVC ablation and single chamber ICD Gen change 8/13/19   6/3/2020 echo EF 25 % but upon further visualization (myself)  the EF appears unchanged from 11/2019 11/13/19 echo EF 30-35 %   5/20/16 echo EF 25%   2/7/19  Echo  EF 30-35%   24 hour holter 2/2019 PVC burden 32.1%   - pt is exercising on elliptical machine   - instructed to monitor symptoms. - discussed BiV upgrade if he becomes symptomatic with heart failure. We will follow up the echo in September. Even though his QRS complex is wide it is nonspecific and given the fact that he has no symptoms resynchronization therapy may not be helpful however if he starts having symptoms I would consider it a strongly. PVC's--   -ECG today Sinus with 1st degree block   -24 hour holter 2/2019 PVC burden 32.1%   -S/p PVC ablation 8/13/19. Felt better afterwards  His PVC burden has decreased to 55/h even further and almost half of what it used to be    Single chamber ICD (Gen change 8/13/19)   The CIED was interrogated and programmed and I supervised and reviewed all the data.  All findings and changes are in device interrogation sheet and reflect my personal interpretation and changes and is scanned to Epic.   10.4 years remaining,   < 0.1%%          -HTN   -not a goal- states not usually high at home  BP goal <130/80  Home BP monitoring encouraged, printed information provided on how to accurately measure BP at home. Counseled to follow a low salt diet to assure blood pressure remains controlled for cardiovascular risk factor modification. The patient is counseled to get regular exercise 3-5 times per week and maintain a healthy weight reduce cardiovascular risk factors.         HLD  On statin           Plan:  Repeat echo in September and follow-up. If the EF is lower then we will proceed with a BiV ICD. Follow up 8-9 months with EPNP  If echo has a significant drop we will place a BiV        If any change in symptoms or EF, will consider Cardiac resynchronization therapy(CRT)  upgrade    Thank you for allowing me to participate in the care of [address] [place] [address]. Further evaluation will be based upon the patient's clinical course and testing results. All questions and concerns were addressed to the patient/family. Alternatives to my treatment were discussed. I have discussed the above stated plan and the patient verbalized understanding and agreed with the plan. NOTE: This report was transcribed using voice recognition software. Every effort was made to ensure accuracy, however, inadvertent computerized transcription errors may be present. Shannan Panda MD, [address]   Office: [phone]    Scribe attestation:  This note was scribed in the presence of Shannan Panda MD by Darwin Pierre RN    I, Dr. Shannan Panda personally performed the services described in this documentation as scribed by RN in my presence, and it is both accurate and complete.

## 2021-06-10 ENCOUNTER — NURSE ONLY (OUTPATIENT)
Dept: CARDIOLOGY CLINIC | Age: 71
End: 2021-06-10
Payer: MEDICARE

## 2021-06-10 ENCOUNTER — OFFICE VISIT (OUTPATIENT)
Dept: CARDIOLOGY CLINIC | Age: 71
End: 2021-06-10
Payer: MEDICARE

## 2021-06-10 VITALS
SYSTOLIC BLOOD PRESSURE: 136 MMHG | WEIGHT: 223 LBS | HEART RATE: 64 BPM | OXYGEN SATURATION: 97 % | DIASTOLIC BLOOD PRESSURE: 84 MMHG | HEIGHT: 73 IN | BODY MASS INDEX: 29.55 KG/M2

## 2021-06-10 DIAGNOSIS — I42.9 PRIMARY CARDIOMYOPATHY (HCC): Primary | Chronic | ICD-10-CM

## 2021-06-10 DIAGNOSIS — I49.3 PVC (PREMATURE VENTRICULAR CONTRACTION): ICD-10-CM

## 2021-06-10 DIAGNOSIS — E78.49 OTHER HYPERLIPIDEMIA: ICD-10-CM

## 2021-06-10 DIAGNOSIS — Z95.810 ICD (IMPLANTABLE CARDIOVERTER-DEFIBRILLATOR) IN PLACE: Chronic | ICD-10-CM

## 2021-06-10 DIAGNOSIS — I42.9 PRIMARY CARDIOMYOPATHY (HCC): Chronic | ICD-10-CM

## 2021-06-10 DIAGNOSIS — I35.0 NONRHEUMATIC AORTIC VALVE STENOSIS: ICD-10-CM

## 2021-06-10 DIAGNOSIS — Z95.810 ICD (IMPLANTABLE CARDIOVERTER-DEFIBRILLATOR) IN PLACE: ICD-10-CM

## 2021-06-10 DIAGNOSIS — I10 ESSENTIAL HYPERTENSION: ICD-10-CM

## 2021-06-10 DIAGNOSIS — I35.9 AORTIC VALVE DISORDER: Chronic | ICD-10-CM

## 2021-06-10 DIAGNOSIS — I42.8 NONISCHEMIC CARDIOMYOPATHY (HCC): ICD-10-CM

## 2021-06-10 DIAGNOSIS — R09.89 BRUIT: ICD-10-CM

## 2021-06-10 PROCEDURE — 99214 OFFICE O/P EST MOD 30 MIN: CPT | Performed by: INTERNAL MEDICINE

## 2021-06-10 NOTE — PROGRESS NOTES
Patient comes in for programming evaluation for his defibrillator. All sensing and pacing parameters are within normal range. Battery life 10.4 years   <0.1%. 13 AF episodes. Appear to be ectopy. PVC Runs (2-4 beats) 4.5 per hour  PVC Singles 16.0 per hour  Patient remains on Coreg. No changes need to be made at this time. Please see interrogation for more detail. Optivol was recently elevated. Possible OptiVol fluid accumulation: 13-Mar-2021 -- 24-Apr-2021. At baseline today. Patient will see Dr. Dwight Barlow today and follow up in 3 months in office or remotely.

## 2021-06-12 PROCEDURE — 93282 PRGRMG EVAL IMPLANTABLE DFB: CPT | Performed by: INTERNAL MEDICINE

## 2021-09-07 ENCOUNTER — TELEPHONE (OUTPATIENT)
Dept: CARDIOLOGY CLINIC | Age: 71
End: 2021-09-07

## 2021-09-07 NOTE — TELEPHONE ENCOUNTER
Pt states he has his Echo at [facility] on 9/13/21 and is asking what the cost will be. Please call to advise. Ok to leave message on [employer].

## 2021-09-13 ENCOUNTER — PROCEDURE VISIT (OUTPATIENT)
Dept: CARDIOLOGY CLINIC | Age: 71
End: 2021-09-13
Payer: MEDICARE

## 2021-09-13 ENCOUNTER — OFFICE VISIT (OUTPATIENT)
Dept: CARDIOLOGY CLINIC | Age: 71
End: 2021-09-13
Payer: MEDICARE

## 2021-09-13 ENCOUNTER — NURSE ONLY (OUTPATIENT)
Dept: CARDIOLOGY CLINIC | Age: 71
End: 2021-09-13
Payer: MEDICARE

## 2021-09-13 VITALS
HEIGHT: 73 IN | OXYGEN SATURATION: 98 % | WEIGHT: 221.4 LBS | DIASTOLIC BLOOD PRESSURE: 80 MMHG | SYSTOLIC BLOOD PRESSURE: 130 MMHG | HEART RATE: 65 BPM | BODY MASS INDEX: 29.34 KG/M2

## 2021-09-13 DIAGNOSIS — I42.9 PRIMARY CARDIOMYOPATHY (HCC): Chronic | ICD-10-CM

## 2021-09-13 DIAGNOSIS — Z95.810 ICD (IMPLANTABLE CARDIOVERTER-DEFIBRILLATOR) IN PLACE: ICD-10-CM

## 2021-09-13 DIAGNOSIS — I35.0 NONRHEUMATIC AORTIC VALVE STENOSIS: Primary | ICD-10-CM

## 2021-09-13 DIAGNOSIS — I35.9 AORTIC VALVE DISORDER: ICD-10-CM

## 2021-09-13 DIAGNOSIS — E78.2 MIXED HYPERLIPIDEMIA: ICD-10-CM

## 2021-09-13 DIAGNOSIS — I42.8 NONISCHEMIC CARDIOMYOPATHY (HCC): ICD-10-CM

## 2021-09-13 DIAGNOSIS — I49.3 PVC (PREMATURE VENTRICULAR CONTRACTION): ICD-10-CM

## 2021-09-13 DIAGNOSIS — I10 ESSENTIAL HYPERTENSION: ICD-10-CM

## 2021-09-13 LAB
LV EF: 28 %
LVEF MODALITY: NORMAL

## 2021-09-13 PROCEDURE — 93295 DEV INTERROG REMOTE 1/2/MLT: CPT | Performed by: INTERNAL MEDICINE

## 2021-09-13 PROCEDURE — 99214 OFFICE O/P EST MOD 30 MIN: CPT | Performed by: INTERNAL MEDICINE

## 2021-09-13 PROCEDURE — 93296 REM INTERROG EVL PM/IDS: CPT | Performed by: INTERNAL MEDICINE

## 2021-09-13 PROCEDURE — 93306 TTE W/DOPPLER COMPLETE: CPT | Performed by: INTERNAL MEDICINE

## 2021-09-13 RX ORDER — CARVEDILOL 6.25 MG/1
6.25 TABLET ORAL 2 TIMES DAILY WITH MEALS
Qty: 180 TABLET | Refills: 3 | Status: SHIPPED | OUTPATIENT
Start: 2021-09-13 | End: 2022-09-19 | Stop reason: SDUPTHER

## 2021-09-13 RX ORDER — LISINOPRIL 10 MG/1
10 TABLET ORAL DAILY
Qty: 90 TABLET | Refills: 3 | Status: SHIPPED | OUTPATIENT
Start: 2021-09-13 | End: 2022-03-26 | Stop reason: SDUPTHER

## 2021-09-13 ASSESSMENT — ENCOUNTER SYMPTOMS
NAUSEA: 0
BLOOD IN STOOL: 0
SHORTNESS OF BREATH: 0
ABDOMINAL DISTENTION: 0
ABDOMINAL PAIN: 0
CHEST TIGHTNESS: 0
PHOTOPHOBIA: 0
COUGH: 0

## 2021-09-13 NOTE — PROGRESS NOTES
We received remote transmission from patient's monitor at home. Transmission shows normal sensing and pacing function. AF recording is not real.  EP physician will review. See interrogation under cardiology tab in the [address] [place] [address] [address] field for more details. Optivol is within normal range. not applicable

## 2021-09-13 NOTE — PROGRESS NOTES
[address]  9/13/21  Referring: Jen Jack NP    REASON FOR CONSULT/CHIEF COMPLAINT/HPI     Reason for visit/ Chief complaint  Follow up  AVR/Cardiomyopathy   HPI Chino Cook is a 66 y.o. seen in follow up for management of AVR and bicuspid aortic valve, non-ischemic cardiomyopathy, PVC ablation, hypertension, hyperlipidemia. He had normal coronaries at the time of his angiogram.   Works with [employer] department 2 days a month    In the [email or web address] since his last visit he was seen by Dr Era Damno. He will have an echo today at 1 pm and if EF is lower will consider BiV  Last visit we increased his coreg to 6.25 bid. He has tolerated this  well. He golfs three times a week, rides a cart. He and also lifts light weights. He uses the elliptical during the winter months, walks daily and is active in the summer months. Push mows half of his yard. Walks fast enough to break a sweat, and is able to continue to exercise without limitations. He has no chest pain shortness of breath palpitations or dizziness No syncope. Slight unchanged edema bilaterally. No orthopnea. He has lost 5 pounds since last visit, with dietary changes. Only eats out once a week. Sleeps flat in bed/or on belly. He has no change in exercise tolerance since last visit. Patient is compliant with medications and is tolerating them well without side effects     HISTORY/ALLERGIES/ROS     MedHx:  has a past medical history of Elevated LDL cholesterol level, Hypertension, Non-ischemic cardiomyopathy ([address] and Severe aortic stenosis. SurgHx:  has a past surgical history that includes hernia repair (Right, 2010); Aortic valvuloplasty (1996); Cardiac defibrillator placement (1996/ 2005); Aortic valve replacement (1/6/2016); Coronary artery bypass graft; and Cataract removal with implant (Left). SocHx:  reports that he has never smoked.  He has never used smokeless tobacco. He reports current alcohol use of about 1.0 - 2.0 standard drinks of alcohol per week. He reports that he does not use drugs. FamHx: No family history of premature coronary artery disease, sudden death, or aneurysm  Allergies: Patient has no known allergies. ROS:   Review of Systems   Constitutional: Negative for activity change, diaphoresis, fatigue and fever. HENT: Negative for congestion and ear discharge. Eyes: Negative for photophobia and visual disturbance. Respiratory: Negative for cough, chest tightness and shortness of breath. Cardiovascular: Negative for chest pain and palpitations. Gastrointestinal: Negative for abdominal distention, abdominal pain, blood in stool and nausea. Endocrine: Negative for cold intolerance and polydipsia. Genitourinary: Negative for difficulty urinating and flank pain. Musculoskeletal: Positive for arthralgias and myalgias. Skin: Negative for rash and wound. Allergic/Immunologic: Negative for environmental allergies and immunocompromised state. Neurological: Negative for dizziness and headaches. Hematological: Negative for adenopathy. Does not bruise/bleed easily. Psychiatric/Behavioral: Negative for confusion. The patient is not hyperactive. MEDICATIONS      Prior to Admission medications    Medication Sig Start Date End Date Taking? Authorizing Provider   lisinopril (PRINIVIL;ZESTRIL) 10 MG tablet Take 1 tablet by mouth daily 3/29/21  Yes Marilynn Marquez DO   carvedilol (COREG) 6.25 MG tablet Take 1 tablet by mouth 2 times daily (with meals) 3/1/21  Yes Marilynn Marquez DO   vitamin C (ASCORBIC ACID) 500 MG tablet Take 500 mg by mouth daily   Yes Historical Provider, MD   Cholecalciferol (VITAMIN D PO) Take 1,000 Units by mouth daily    Yes Historical Provider, MD   aspirin 81 MG tablet Take 81 mg by mouth daily.    Yes Historical Provider, MD       PHYSICAL EXAM        Vitals:    09/13/21 0749   BP: 130/80   Pulse: 65   SpO2: 98%    Weight: 221 lb 6.4 oz (100.4 kg)     Gen Alert, cooperative, no distress Heart  Irregularly irregular, 1/6 murmur   Head Normocephalic, atraumatic, no abnormalities Abd  Soft, NT, +BS, no mass, no OM   Eyes PERRLA, conj/corn clear Ext  Ext nl, AT, no C/C, no edema   Nose Nares normal, no drain age, Non-tender Pulse 2+ and symmetric   Throat Lips, mucosa, tongue normal Skin Color/text/turg nl, no rash/lesions   Neck S/S, TM, NT, no bruit Psych Nl mood and affect   Lung CTA-B, unlabored, no DTP     Ch wall NT, no deform       LABS and Imaging     Relevant and available CV data reviewed  Echo/MRI: 6/3/2020 -Left ventricular cavity size is mildly dilated with borderline concentric   left ventricular hypertrophy. -Overall left ventricular systolic function appears severely reduced with an   ejection fraction in the 25% range.  -There is moderate to severe diffuse hypokinesis with akinesis of the apex. -Grade II diastolic dysfunction with elevated LV filling  pressures. E/e\"=13.9.  -Mild mitral regurgitation.  -A bioprosthetic artificial aortic valve appears well seated with a maximum   velocity of 3.6m/s and a mean gradient of 31mmHg. This suggests at least  moderate stenosis. This is fairly similar to prior studies.  -Trivial aortic regurgitation.  -Mild tricuspid regurgitation.  -Estimated pulmonary artery systolic pressure is mildly elevated at 35 mmHg  assuming a right atrial pressure of 3 mmHg. -The left atrium is dilated. -Pacemaker / ICD lead is visualized in the right heart.   Muga 2015  EF 36%  Cath:   normal coronaries  Holter: 2019  PVC burden 32.1 %  EK20 Sinus  Rhythm  -First degree A-V block Davida = 242 -Right bundle branch block with left axis -bifascicular block.  -Atypical T axis -possible acute process , ekg personally interpreted  Stress:none  abd ultrasound--2016 No AAA  Carotid dopplers- no significant disease  moderate Risk  moderate Complexity/Medical Decision Making  Outside records Reviewed  Labs Reviewed 10/9/2020  Tc 169 hdl 52 ldl 106 tri 56 alt 15 ast 21  k 4.7  Bun 16 creat 1.1  Prior Imaging, ekg, cath, echo reviewed when available  Medications reviewed  Old Notes reviewed  Management options discussed  . ac  ASSESSMENT AND PLAN     1. History of aortic valve replacement x 2  - congenital bicuspid aortic valve  - repair at [facility] [address], redo 1/16  - bioprosthetic  - 6/2020 gradient is 31 mmHg c/w at least moderate stenosis but fairly unchanged  -stable  Plan  - recommend screening echo for children given history of bicuspid valve  - recommend prophylactic antibiotic prior dental procedure- amoxicillen 2 g  - continue aspirin  - echo today      2. Nonischemic Cardiomyopathy  -   NYHA class I, ACC C  -   on lisinopril and coreg  -   Developed hyperkalemia with entresto  -   not on spironolactone due to history of hyperkalemia  Plan  - echo today, if EF low will consider BiV( [facility])    3. Frequent PVCs/PVC ablation  -   8/13/2019 ablation ( Dr Feli Monroe)  -   PVC burden decreased to 55/hour  -   stable  Plan   -   Continue current treatment  -   Will call for changes    4. Single chamber ICD  -  generator change 8/13/19  -  no shocks  -  history of vf  Plan  - continue above  - instructed to avoid qtc prolonging medications, abx and supplements. Call with any med questions      4. Essential Hypertension  -  130/80  - on coreg 6.25  bid lisinopril 10 mg daily  -stable  Plan  - continue coreg      5. Mixed Hyperlipidemia  - 2016 lipitor recommended, but he wanted diet control  - 2020  not to goal   Plan  - prefers to control lipids with diet    6.  overweight  - not interested in seeing a dietician  - will attempt to lose weight with dietary changes   Plan  - goal weight next visit is 210    Patient counseled on lifestyle modification, diet, and exercise. Recommend increase to 4 days a week on elliptical, and use hand weights    Follow Up:    One year      Dr. Merlyn Spicer:  I, Shayy Corrales, di scribing for and in the presence of Shagufta Davis MD.   Zuleyka Fuller 09/13/21 8:12 AM   Physician Attestation  The scribe for and in the presence of me Darnell Shone, DO). The scribe Rachel Hernandezveryesika may have prepopulated components of this note with my historical  intellectual property under my direct supervision. Any additions to this intellectual property were performed in my presence and at my direction.   Furthermore, the content and accuracy of this note have been reviewed by me Darnell Shone, DO).  9/13/2021 8:12 AM

## 2021-09-13 NOTE — PATIENT INSTRUCTIONS
Continue current medications    Weight goal next visit 210    Will evaluate the echo that is done today    Call with any changes    Do not recommend z pack/ new medications without calling first

## 2021-09-13 NOTE — LETTER
[address] [facility] Cardiology  [id number] [address]  Phone: [phone]  Fax: [address], DO    September 13, 2021     AMY Fuentes CNP  [address] [address]    Patient: Martine Vergara   MR Number: [id number]   YOB: 1950   Date of Visit: 9/13/2021       Dear Yan Baptiste: Thank you for referring Francisco Patel to me for evaluation/treatment. If you have questions, please do not hesitate to call me. I look forward to following Freddy along with you.     Sincerely,      Michelle Myers, DO

## 2021-10-20 ENCOUNTER — OFFICE VISIT (OUTPATIENT)
Dept: INTERNAL MEDICINE CLINIC | Age: 71
End: 2021-10-20
Payer: MEDICARE

## 2021-10-20 VITALS
SYSTOLIC BLOOD PRESSURE: 132 MMHG | OXYGEN SATURATION: 96 % | HEIGHT: 73 IN | DIASTOLIC BLOOD PRESSURE: 72 MMHG | BODY MASS INDEX: 29.61 KG/M2 | WEIGHT: 223.4 LBS | HEART RATE: 62 BPM

## 2021-10-20 DIAGNOSIS — E78.49 OTHER HYPERLIPIDEMIA: ICD-10-CM

## 2021-10-20 DIAGNOSIS — I10 ESSENTIAL HYPERTENSION: ICD-10-CM

## 2021-10-20 DIAGNOSIS — Z00.00 ROUTINE GENERAL MEDICAL EXAMINATION AT A HEALTH CARE FACILITY: Primary | ICD-10-CM

## 2021-10-20 DIAGNOSIS — R97.20 ABNORMAL PSA: ICD-10-CM

## 2021-10-20 PROCEDURE — G0439 PPPS, SUBSEQ VISIT: HCPCS | Performed by: NURSE PRACTITIONER

## 2021-10-20 SDOH — ECONOMIC STABILITY: FOOD INSECURITY: WITHIN THE PAST 12 MONTHS, THE FOOD YOU BOUGHT JUST DIDN'T LAST AND YOU DIDN'T HAVE MONEY TO GET MORE.: NEVER TRUE

## 2021-10-20 SDOH — ECONOMIC STABILITY: FOOD INSECURITY: WITHIN THE PAST 12 MONTHS, YOU WORRIED THAT YOUR FOOD WOULD RUN OUT BEFORE YOU GOT MONEY TO BUY MORE.: NEVER TRUE

## 2021-10-20 ASSESSMENT — LIFESTYLE VARIABLES
HOW MANY STANDARD DRINKS CONTAINING ALCOHOL DO YOU HAVE ON A TYPICAL DAY: 0
AUDIT TOTAL SCORE: 3
HOW OFTEN DURING THE LAST YEAR HAVE YOU FOUND THAT YOU WERE NOT ABLE TO STOP DRINKING ONCE YOU HAD STARTED: 0
HOW OFTEN DO YOU HAVE A DRINK CONTAINING ALCOHOL: 3
HOW OFTEN DURING THE LAST YEAR HAVE YOU BEEN UNABLE TO REMEMBER WHAT HAPPENED THE NIGHT BEFORE BECAUSE YOU HAD BEEN DRINKING: 0
HOW OFTEN DURING THE LAST YEAR HAVE YOU HAD A FEELING OF GUILT OR REMORSE AFTER DRINKING: 0
HOW OFTEN DO YOU HAVE SIX OR MORE DRINKS ON ONE OCCASION: 0
HOW OFTEN DURING THE LAST YEAR HAVE YOU FAILED TO DO WHAT WAS NORMALLY EXPECTED FROM YOU BECAUSE OF DRINKING: 0
HOW OFTEN DURING THE LAST YEAR HAVE YOU NEEDED AN ALCOHOLIC DRINK FIRST THING IN THE MORNING TO GET YOURSELF GOING AFTER A NIGHT OF HEAVY DRINKING: 0
AUDIT-C TOTAL SCORE: 3
HAS A RELATIVE, FRIEND, DOCTOR, OR ANOTHER HEALTH PROFESSIONAL EXPRESSED CONCERN ABOUT YOUR DRINKING OR SUGGESTED YOU CUT DOWN: 0
HAVE YOU OR SOMEONE ELSE BEEN INJURED AS A RESULT OF YOUR DRINKING: 0

## 2021-10-20 ASSESSMENT — PATIENT HEALTH QUESTIONNAIRE - PHQ9
SUM OF ALL RESPONSES TO PHQ9 QUESTIONS 1 & 2: 0
2. FEELING DOWN, DEPRESSED OR HOPELESS: 0
1. LITTLE INTEREST OR PLEASURE IN DOING THINGS: 0
SUM OF ALL RESPONSES TO PHQ QUESTIONS 1-9: 0

## 2021-10-20 ASSESSMENT — SOCIAL DETERMINANTS OF HEALTH (SDOH): HOW HARD IS IT FOR YOU TO PAY FOR THE VERY BASICS LIKE FOOD, HOUSING, MEDICAL CARE, AND HEATING?: NOT HARD AT ALL

## 2021-10-20 NOTE — PROGRESS NOTES
Medicare Annual Wellness Visit  Name: Priya Hernandez Date: 10/20/2021   MRN: [id number] Sex: Male   Age: 66 y.o. Ethnicity: Non- / Non    : 1950 Race: White (non-)      Yoli Robertosn is here for Medicare AWV    Screenings for behavioral, psychosocial and functional/safety risks, and cognitive dysfunction are all negative except as indicated below. These results, as well as other patient data from the [address] form, are documented in Flowsheets linked to this Encounter. Hypertension- sees cardiology- Dr. Cem Aguilar and Dr. Carol Ruiz twice daily and lisinopril daily.  Denies side effects.  Blood pressure stable. No chest pain, palpitations, shortness of breath, trouble breathing, lightheadedness, dizziness or blurred vision. hyperlipidemia- exercises via the gym. Abnormal PSA- Was evaluated by a specialist. Has had biopsies with negative results. Asymptomatic. 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 Then  of [place]. . 2 sons. 2 grandchildren. For fun, he enjoys golf. No Known Allergies         Prior to Visit Medications    Medication Sig Taking? Authorizing Provider   lisinopril (PRINIVIL;ZESTRIL) 10 MG tablet Take 1 tablet by mouth daily Yes Napolean Bream, DO   carvedilol (COREG) 6.25 MG tablet Take 1 tablet by mouth 2 times daily (with meals) Yes Napolean Bream, DO   vitamin C (ASCORBIC ACID) 500 MG tablet Take 500 mg by mouth daily Yes Historical Provider, MD   Cholecalciferol (VITAMIN D PO) Take 1,000 Units by mouth daily  Yes Historical Provider, MD   aspirin 81 MG tablet Take 81 mg by mouth daily.  Yes Historical Provider, MD         Past Medical History:   Diagnosis Date    Abnormal PSA     Elevated LDL cholesterol level     Hypertension     Non-ischemic cardiomyopathy (HCC)     Severe aortic stenosis     Vitamin D deficiency        Past Surgical History:   Procedure Laterality Date  AORTIC VALVE REPLACEMENT  01/06/2016    Dr. Nai Griffith - 27 mm Mosaic Ultra porcine valve #[id number]    AORTIC VALVULOPLASTY  1996    AV repair @ [address]  [phone] 2005    for CM/ now turned off does not need    CATARACT REMOVAL WITH IMPLANT Left     CORONARY ARTERY BYPASS GRAFT      HERNIA REPAIR Right 2010         Family History   Problem Relation Age of Onset    Cancer Mother         skin    Lung Cancer Father         passed at 57 y/o    Heart Disease Neg Hx     High Blood Pressure Neg Hx     High Cholesterol Neg Hx     Heart Attack Neg Hx     Stroke Neg Hx     Prostate Cancer Neg Hx        CareTeam (Including outside providers/suppliers regularly involved in providing care):   Patient Care Team:  AMY Shepard CNP as PCP - General (Nurse Practitioner)  AMY Shepard CNP as PCP - [facility]  Virginia Solis MD as Consulting Physician (Cardiology)  Jacklyn Null MD as Consulting Physician (Electrophysiology)    Wt Readings from Last 3 Encounters:   10/20/21 223 lb 6.4 oz (101.3 kg)   09/13/21 221 lb 6.4 oz (100.4 kg)   06/10/21 223 lb (101.2 kg)     Vitals:    10/20/21 0746   BP: 132/72   Pulse: 62   SpO2: 96%   Weight: 223 lb 6.4 oz (101.3 kg)   Height: 6' 1\" (1.854 m)     Body mass index is 29.47 kg/m². Based upon direct observation of the patient, evaluation of cognition reveals recent and remote memory intact. Physical Exam  Vitals reviewed. Constitutional:       General: He is not in acute distress. Appearance: He is well-developed. He is not diaphoretic. HENT:      Head: Normocephalic and atraumatic. Eyes:      Pupils: Pupils are equal, round, and reactive to light. Cardiovascular:      Rate and Rhythm: Normal rate and regular rhythm. Heart sounds: Murmur heard. Pulmonary:      Effort: Pulmonary effort is normal. No respiratory distress.       Breath sounds: Normal breath sounds. No wheezing or rales. Chest:      Chest wall: No tenderness. Abdominal:      General: Bowel sounds are normal.      Palpations: Abdomen is soft. Skin:     General: Skin is warm and dry. Neurological:      Mental Status: He is alert and oriented to person, place, and time. Coordination: Coordination normal.   Psychiatric:         Mood and Affect: Mood normal.       Patient's complete Health Risk Assessment and screening values have been reviewed and are found in Flowsheets. The following problems were reviewed today and where indicated follow up appointments were made and/or referrals ordered. Positive Risk Factor Screenings with Interventions:            General Health and ACP:  General  In general, how would you say your health is?: Excellent  In the past 7 days, have you experienced any of the following? New or Increased Pain, New or Increased Fatigue, Loneliness, Social Isolation, Stress or Anger?: None of These  Do you get the social and emotional support that you need?: Yes  Do you have a Living Will?: (!) No  Advance Directives     Power of  Living Will ACP-Advance Directive ACP-Power of     Not on File Filed on 12/12/15 Filed Not on File      General Health Risk Interventions:  · No Living Will: Advance Care Planning addressed with patient today    Health Habits/Nutrition:  Health Habits/Nutrition  Do you exercise for at least 20 minutes 2-3 times per week?: Yes  Have you lost any weight without trying in the past 3 months?: No  Do you eat only one meal per day?: No  Have you seen the dentist within the past year?: (!) No  Body mass index: (!) 29.47  Health Habits/Nutrition Interventions:  · Dental exam overdue:  patient encouraged to make appointment with his/her dentist   · Exercises at the gym 3x/week and golfs.        Personalized Preventive Plan   Current Health Maintenance Status  Immunization History   Administered Date(s) Administered    COVID-19, Moderna, PF, 100mcg/0.5mL 02/02/2021, 03/02/2021    Influenza Virus Vaccine 10/24/2005, 10/09/2014    Influenza, High Dose (Fluzone 65 yrs and older) 11/06/2015, 11/30/2016, 09/18/2018    Influenza, High-dose, Estrella Ronquillo, 65 yrs +, IM (Fluzone) 09/12/2020    Influenza, Quadv, IM, PF (6 mo and older Fluzone, Flulaval, Fluarix, and 3 yrs and older Afluria) 10/03/2017, 10/02/2019    Pneumococcal Conjugate 13-valent ([id number]) 11/30/2016    Pneumococcal Polysaccharide ([id number]) 10/01/2006, 10/24/2013, 11/06/2015, 10/02/2019    Tdap (Boostrix, Adacel) 02/23/2016    Zoster Live (Zostavax) 11/19/2013      Health Maintenance   Topic Date Due    Shingles Vaccine (2 of 3) 01/14/2014    Potassium monitoring  10/09/2021    Creatinine monitoring  10/09/2021    Annual Wellness Visit (AWV)  10/10/2021    Colon Cancer Screen FIT/FOBT  08/03/2022    Lipid screen  10/09/2025    DTaP/Tdap/Td vaccine (2 - Td or Tdap) 02/23/2026    Flu vaccine  Completed    Pneumococcal 65+ years Vaccine  Completed    COVID-19 Vaccine  Completed    Hepatitis C screen  Completed    Hepatitis A vaccine  Aged Out    Hepatitis B vaccine  Aged Out    Hib vaccine  Aged Out    Meningococcal (ACWY) vaccine  Aged Out     Recommendations for Nazar Due: see orders and patient instructions/AVS.    Recommended screening schedule for the next 5-10 years is provided to the patient in written form: see Patient Evie Crowley was seen today for medicare awv. Diagnoses and all orders for this visit:    Routine general medical examination at a health care facility   - AWV today    Essential hypertension  -    BP stable today. - Continue with cardiology. - Comprehensive Metabolic Panel; Future  -     CBC Auto Differential; Future    Other hyperlipidemia  -    Lifestyle modifications such as exercise, weight loss and healthy diet encouraged and reviewed with the pt. - Lipid, Fasting;  Future    Abnormal PSA  - Asymptomatic. Will evaluate. - PSA, Prostatic Specific Antigen; Future    Return for Medicare Annual Wellness Visit in 1 year. All questions answered. Patient states no further questions or concerns at this time.   Electronically signed by: AMY You CNP 10/20/21

## 2021-10-20 NOTE — PATIENT INSTRUCTIONS
Please get your fasting lab work (no food or drink for 10-12 hours prior besides water) completed M-F 830a-430p at our office. [facility] lab has walk-in hours available as well - they are open Saturday 7a-3p - no appointment is needed. We will call with your results. Personalized Preventive Plan for Pascual Harris - 10/20/2021  Medicare offers a range of preventive health benefits. Some of the tests and screenings are paid in full while other may be subject to a deductible, co-insurance, and/or copay. Some of these benefits include a comprehensive review of your medical history including lifestyle, illnesses that may run in your family, and various assessments and screenings as appropriate. After reviewing your medical record and screening and assessments performed today your provider may have ordered immunizations, labs, imaging, and/or referrals for you. A list of these orders (if applicable) as well as your Preventive Care list are included within your After Visit Summary for your review. Other Preventive Recommendations:    · A preventive eye exam performed by an eye specialist is recommended every 1-2 years to screen for glaucoma; cataracts, macular degeneration, and other eye disorders. · A preventive dental visit is recommended every 6 months. · Try to get at least 150 minutes of exercise per week or 10,000 steps per day on a pedometer . · Order or download the FREE \"Exercise & Physical Activity: Your Everyday Guide\" from [employer]. Call 6-511.343.5343 or search [employer] online. · You need 8066-6151 mg of calcium and 1184-4627 IU of vitamin D per day. It is possible to meet your calcium requirement with diet alone, but a vitamin D supplement is usually necessary to meet this goal.  · When exposed to the sun, use a sunscreen that protects against both UVA and UVB radiation with an SPF of 30 or greater.  Reapply every 2 to 3 hours or after sweating, drying off with a towel, or swimming. · Always wear a seat belt when traveling in a car. Always wear a helmet when riding a bicycle or motorcycle.

## 2021-10-27 DIAGNOSIS — E78.49 OTHER HYPERLIPIDEMIA: ICD-10-CM

## 2021-10-27 DIAGNOSIS — R97.20 ABNORMAL PSA: ICD-10-CM

## 2021-10-27 DIAGNOSIS — I10 ESSENTIAL HYPERTENSION: ICD-10-CM

## 2021-10-27 LAB
A/G RATIO: 1.7 (ref 1.1–2.2)
ALBUMIN SERPL-MCNC: 4.3 G/DL (ref 3.4–5)
ALP BLD-CCNC: 83 U/L (ref 40–129)
ALT SERPL-CCNC: 17 U/L (ref 10–40)
ANION GAP SERPL CALCULATED.3IONS-SCNC: 11 MMOL/L (ref 3–16)
AST SERPL-CCNC: 19 U/L (ref 15–37)
BASOPHILS ABSOLUTE: 0 K/UL (ref 0–0.2)
BASOPHILS RELATIVE PERCENT: 0.9 %
BILIRUB SERPL-MCNC: 0.6 MG/DL (ref 0–1)
BUN BLDV-MCNC: 17 MG/DL (ref 7–20)
CALCIUM SERPL-MCNC: 9.1 MG/DL (ref 8.3–10.6)
CHLORIDE BLD-SCNC: 106 MMOL/L (ref 99–110)
CHOLESTEROL, FASTING: 176 MG/DL (ref 0–199)
CO2: 27 MMOL/L (ref 21–32)
CREAT SERPL-MCNC: 1.1 MG/DL (ref 0.8–1.3)
EOSINOPHILS ABSOLUTE: 0.3 K/UL (ref 0–0.6)
EOSINOPHILS RELATIVE PERCENT: 5 %
GFR AFRICAN AMERICAN: >60
GFR NON-AFRICAN AMERICAN: >60
GLOBULIN: 2.5 G/DL
GLUCOSE BLD-MCNC: 88 MG/DL (ref 70–99)
HCT VFR BLD CALC: 43 % (ref 40.5–52.5)
HDLC SERPL-MCNC: 47 MG/DL (ref 40–60)
HEMOGLOBIN: 14 G/DL (ref 13.5–17.5)
LDL CHOLESTEROL CALCULATED: 115 MG/DL
LYMPHOCYTES ABSOLUTE: 1.4 K/UL (ref 1–5.1)
LYMPHOCYTES RELATIVE PERCENT: 27 %
MCH RBC QN AUTO: 32.3 PG (ref 26–34)
MCHC RBC AUTO-ENTMCNC: 32.6 G/DL (ref 31–36)
MCV RBC AUTO: 99.1 FL (ref 80–100)
MONOCYTES ABSOLUTE: 0.5 K/UL (ref 0–1.3)
MONOCYTES RELATIVE PERCENT: 10 %
NEUTROPHILS ABSOLUTE: 3 K/UL (ref 1.7–7.7)
NEUTROPHILS RELATIVE PERCENT: 57.1 %
PDW BLD-RTO: 12.9 % (ref 12.4–15.4)
PLATELET # BLD: 136 K/UL (ref 135–450)
PMV BLD AUTO: 8.8 FL (ref 5–10.5)
POTASSIUM SERPL-SCNC: 4.8 MMOL/L (ref 3.5–5.1)
PROSTATE SPECIFIC ANTIGEN: 3.35 NG/ML (ref 0–4)
RBC # BLD: 4.34 M/UL (ref 4.2–5.9)
SODIUM BLD-SCNC: 144 MMOL/L (ref 136–145)
TOTAL PROTEIN: 6.8 G/DL (ref 6.4–8.2)
TRIGLYCERIDE, FASTING: 72 MG/DL (ref 0–150)
VLDLC SERPL CALC-MCNC: 14 MG/DL
WBC # BLD: 5.2 K/UL (ref 4–11)

## 2021-11-04 ENCOUNTER — TELEPHONE (OUTPATIENT)
Dept: CARDIOLOGY CLINIC | Age: 71
End: 2021-11-04

## 2021-11-04 NOTE — TELEPHONE ENCOUNTER
RAH. .. patient calling to let FABRICIO know that his heart monitor wont hold a charge .  He is calling monitor company to see if they will send him a new one since he has a remote device appt in December

## 2021-11-04 NOTE — TELEPHONE ENCOUNTER
This is exactly how he should troubleshoot his monitor. Should he have any issues with this route or not have his device we can have him come in to get his device check. Please have him call if he is having issues.      AMY Chua-CNP

## 2021-11-04 NOTE — TELEPHONE ENCOUNTER
I spoke with pt and he stated that he called the company and they are shipping another out. Should be there in about 3 days.

## 2021-12-13 ENCOUNTER — NURSE ONLY (OUTPATIENT)
Dept: CARDIOLOGY CLINIC | Age: 71
End: 2021-12-13
Payer: MEDICARE

## 2021-12-13 DIAGNOSIS — I42.8 NONISCHEMIC CARDIOMYOPATHY (HCC): ICD-10-CM

## 2021-12-13 DIAGNOSIS — Z95.810 ICD (IMPLANTABLE CARDIOVERTER-DEFIBRILLATOR) IN PLACE: ICD-10-CM

## 2021-12-14 PROCEDURE — 93296 REM INTERROG EVL PM/IDS: CPT | Performed by: INTERNAL MEDICINE

## 2021-12-14 PROCEDURE — 93295 DEV INTERROG REMOTE 1/2/MLT: CPT | Performed by: INTERNAL MEDICINE

## 2021-12-14 NOTE — PROGRESS NOTES
We received remote transmission from patient's monitor at home. Transmission shows normal sensing and pacing function. AF recording is not real.  EP physician will review. See interrogation under cardiology tab in the [address] [place] [address] [address] field for more details. Optivol is within normal range.

## 2022-01-05 RX ORDER — AMOXICILLIN 500 MG/1
CAPSULE ORAL
Qty: 4 CAPSULE | Refills: 3 | Status: SHIPPED | OUTPATIENT
Start: 2022-01-05

## 2022-02-09 ENCOUNTER — OFFICE VISIT (OUTPATIENT)
Dept: CARDIOLOGY CLINIC | Age: 72
End: 2022-02-09
Payer: MEDICARE

## 2022-02-09 ENCOUNTER — NURSE ONLY (OUTPATIENT)
Dept: CARDIOLOGY CLINIC | Age: 72
End: 2022-02-09
Payer: MEDICARE

## 2022-02-09 VITALS
HEART RATE: 67 BPM | OXYGEN SATURATION: 97 % | BODY MASS INDEX: 28.84 KG/M2 | WEIGHT: 217.6 LBS | SYSTOLIC BLOOD PRESSURE: 130 MMHG | HEIGHT: 73 IN | DIASTOLIC BLOOD PRESSURE: 82 MMHG

## 2022-02-09 DIAGNOSIS — I51.9 LV DYSFUNCTION: ICD-10-CM

## 2022-02-09 DIAGNOSIS — I10 PRIMARY HYPERTENSION: ICD-10-CM

## 2022-02-09 DIAGNOSIS — I35.9 AORTIC VALVE DISEASE: ICD-10-CM

## 2022-02-09 DIAGNOSIS — E78.49 OTHER HYPERLIPIDEMIA: ICD-10-CM

## 2022-02-09 DIAGNOSIS — I42.8 NONISCHEMIC CARDIOMYOPATHY (HCC): ICD-10-CM

## 2022-02-09 DIAGNOSIS — Z95.810 ICD (IMPLANTABLE CARDIOVERTER-DEFIBRILLATOR) IN PLACE: Chronic | ICD-10-CM

## 2022-02-09 DIAGNOSIS — I49.3 PVC (PREMATURE VENTRICULAR CONTRACTION): Primary | ICD-10-CM

## 2022-02-09 DIAGNOSIS — I42.8 OTHER CARDIOMYOPATHY (HCC): ICD-10-CM

## 2022-02-09 DIAGNOSIS — R94.31 EKG ABNORMALITIES: ICD-10-CM

## 2022-02-09 PROCEDURE — 93282 PRGRMG EVAL IMPLANTABLE DFB: CPT | Performed by: INTERNAL MEDICINE

## 2022-02-09 PROCEDURE — 99214 OFFICE O/P EST MOD 30 MIN: CPT | Performed by: INTERNAL MEDICINE

## 2022-02-09 NOTE — PROGRESS NOTES
[facility]   Electrophysiology Follow Up  Date: 2022      CC: Cardiomyopathy/PVC's   HPI: Kylie Nelson is a 66 y.o. male who has a history of AVR(repair at [facility] [id number], redo1/)cardiomyopathy, (AICD second device--battery , was turned off, at the time  His EF was  45%), HTN, HLD. He is s/p PVC ablation and single chamber ICD Gen change 19. Interval History:  Jacey Soria presents today in follow up. He states he feels great. Assessment and plan:     Nonischemic cardiomyopathy   - LVEF 2021 25-30%   - Continue coreg and lisinopril    - S/p PVC ablation and single chamber ICD Gen change 19   - 6/3/2020 echo EF 25 % but upon further visualization (myself) the EF appears unchanged from 2019   - 19 echo EF 30-35 %      - 19  Echo  EF 30-35%   - 24 hour holter 2019 PVC burden 32.1%    - 16 echo EF 25%   - follows with Dr. Veronica Wray      - pt is exercising on elliptical machine   - instructed to monitor symptoms. - discussed BiV upgrade if he becomes symptomatic with heart failure. Even though his QRS complex is wide it is nonspecific and given the fact that he has no symptoms resynchronization therapy may not be helpful however if he starts having symptoms I would consider it a strongly. His EF is basically the same, he remains asymptomatic. I will do a MUGA to more objectively assess the LV function. Given the extremely wide QRS of 180 ms, I am concerned about worsening of LV function over time. We can monitor his LV function with echo or MUGA over time to have a better assessment and longitudinal follow-up. And if his symptoms or LV function change, we can consider upgrading to CRT-D.    PVC's   -ECG today Sinus Rhythm with RBBB 1st degree block    PVC burden on interrogation today is low    -continue coreg 6.25 mg    -24 hour holter 2019 PVC burden 32.1%   -S/p PVC ablation 19.  Felt better afterwards  His PVC burden has decreased to 5.9/h even further . Single chamber ICD (Gen change 8/13/19)   The CIED was interrogated and programmed and I supervised and reviewed all the data. All findings and changes are in device interrogation sheet and reflect my personal interpretation and changes and is scanned to Epic. 9.9  years remaining,   < 0.1% % , optivol WNL ,  3 recorded atrial fibrillation episodes  Last on 02/05/2022  -are not Real and are in fact due to a few PVCs. Aortic valve disorder/Aortic Valve repair 1996, redo 1/16   Gradient is 31 mmHg consistent with at least moderate stenosis but  fairly unchanged from his previous echoes. Bioprosthetic valve is otherwise functioning normally. HTN  -borderline 130/82   -BP goal <130/80  -Home BP monitoring encouraged, printed information provided on how to accurately measure BP at home.  -Counseled to follow a low salt diet to assure blood pressure remains controlled for cardiovascular risk factor modification.   -The patient is counseled to get regular exercise 3-5 times per week and maintain a healthy weight reduce cardiovascular risk factors.         HLD   - no meds - prefers to control with diet     - lipids 10/27/2021   - follows with Dr. Chely Belle         Plan:  MUGA  Follow up in 6 months   .         Past Medical History:   Diagnosis Date    Abnormal PSA     Elevated LDL cholesterol level     Hypertension     Non-ischemic cardiomyopathy (HCC)     Severe aortic stenosis     Vitamin D deficiency         Past Surgical History:   Procedure Laterality Date    AORTIC VALVE REPLACEMENT  01/06/2016    Dr. Chavis Abt - 27 mm Mosaic Ultra porcine valve #[id number]    AORTIC VALVULOPLASTY  1996    AV repair @ [address]  [phone] 2005    for CM/ now turned off does not need    CATARACT REMOVAL WITH IMPLANT Left     CORONARY ARTERY BYPASS GRAFT      HERNIA REPAIR Right 2010       Allergies:  No Known Allergies    Social History:   reports that he has never smoked. He has never used smokeless tobacco. He reports current alcohol use of about 3.0 - 4.0 standard drinks of alcohol per week. He reports that he does not use drugs. Family History:     Reviewed. Denies family history of sudden cardiac death, arrhythmia, premature CAD    Review of System:  All other systems reviewed and are negative except for that noted above. Pertinent negatives are:   General: negative for fever, chills   Ophthalmic ROS: negative for - eye pain or loss of vision  ENT ROS: negative for - headaches, sore throat   Respiratory: negative for - cough, sputum  Cardiovascular: Reviewed in HPI  Gastrointestinal: negative for - abdominal pain, diarrhea, N/V  Hematology: negative for - bleeding, blood clots, bruising or jaundice  Genito-Urinary:  negative for - Dysuria or incontinence  Musculoskeletal: negative for - Joint swelling, muscle pain  Neurological: negative for - confusion, dizziness, headaches   Psychiatric: No anxiety, no depression. Dermatological: negative for - rash    Physical Examination:  Vitals:    02/09/22 1533   BP: 130/82   Pulse: 67   SpO2: 97%      Wt Readings from Last 3 Encounters:   02/09/22 217 lb 9.6 oz (98.7 kg)   10/20/21 223 lb 6.4 oz (101.3 kg)   09/13/21 221 lb 6.4 oz (100.4 kg)        · Constitutional: Oriented. No distress. · Head: Normocephalic and atraumatic. · Mouth/Throat: Oropharynx is clear and moist.   · Eyes: Conjunctivae normal. EOM are normal.   · Neck: Neck supple. No rigidity. No JVD present. · Cardiovascular: Normal rate, regular rhythm, S1&S2. · Pulmonary/Chest: Bilateral respiratory sounds. No wheezes, No rhonchi. · Abdominal: Soft. Bowel sounds present. No distension, No tenderness. · Musculoskeletal: No tenderness. No edema    · Lymphadenopathy: Has no cervical adenopathy. · Neurological: Alert and oriented.  Cranial nerve appears intact, No Gross deficit   · Skin: Skin is warm and dry. No rash noted. · Psychiatric: Has a normal behavior       Labs, diagnostic and imaging results reviewed. Reviewed. Lab Results   Component Value Date    CREATININE 1.1 10/27/2021    CREATININE 1.1 10/09/2020    AST 19 10/27/2021    ALT 17 10/27/2021       ECG today: sinus rhythm with RBBB     Echo 09/13/2021  Summary   -Left ventricular cavity size is normal with mild concentric left   ventricular hypertrophy.   -Overall left ventricular systolic function appears severely reduced with an   ejection fraction in the 25-30% range.   -There is moderate to severe diffuse hypokinesis. -Grade II diastolic dysfunction with elevated LV filling   pressures. E/e\"=11.45.   -Mild mitral regurgitation.   -A bioprosthetic artificial aortic valve appears well seated with elevated   velocities/gradients, maximum velocity of 3.5m/s and a mean gradient of   31mmHg. -Appears stable when compared to previous echocardiogram from 6/3/2020.   -Mild tricuspid regurgitation.   -Estimated pulmonary artery systolic pressure is borderline elevated at 37   mmHg assuming a right atrial pressure of 3 mmHg. -The left atrium is mildly dilated. -Right ventricular systolic function is mildly reduced .   -Pacemaker / ICD lead is visualized in the right heart     Echo 6/3/2020  Summary   -Left ventricular cavity size is mildly dilated with borderline concentric   left ventricular hypertrophy.   -Overall left ventricular systolic function appears severely reduced with an   ejection fraction in the 25% range. 6/3/2020 echo EF 25 % but upon further visualization the EF appears unchanged from 11/2019   -There is moderate to severe diffuse hypokinesis with akinesis of the apex. -Grade II diastolic dysfunction with elevated LV filling   pressures. E/e\"=13.9.   -Mild mitral regurgitation.   -A bioprosthetic artificial aortic valve appears well seated with a maximum   velocity of 3.6m/s and a mean gradient of 31mmHg.  This suggests at least   moderate stenosis. This is fairly similar to prior studies.   -Trivial aortic regurgitation.   -Mild tricuspid regurgitation.   -Estimated pulmonary artery systolic pressure is mildly elevated at 35 mmHg   assuming a right atrial pressure of 3 mmHg. -The left atrium is dilated. -Pacemaker / ICD lead is visualized in the right heart. Echo 11/13/19  Summary   -Global left ventricular function is moderately decreased with ejection   fraction estimated from 30 % to 35 %. E/e'= 12.25   -There is moderate diffuse hypokinesis.   -Indeterminate diastolic function. -Mild mitral regurgitation is present.   -A bioprosthetic artificial aortic valve appears well seated with a maximum   velocity of 3.71m/s and a mean gradient of 27mmHg. -Mild aortic regurgitation. P1/2t= 724   -Mild tricuspid regurgitation. PASP = 34 mmHg. Echo: 2/7/19  Summary   -Left ventricular cavity size is moderately dilated. Normal left ventricular   wall thickness. Overall left ventricular systolic function appears severely   reduced. There is severe diffuse hypokinesis. Ejection fraction is visually   estimated to be 30-35%.   -Grade II diastolic dysfunction with elevated LV filling pressures.   E/e\"=27.25   -A porcine aortic valve appears well seated with a maximum gradient of 44   mmHg and a mean gradient of 26 mmHg. Mild aortic regurgitation.   -Mild mitral regurgitation is present.   -There is mild tricuspid regurgitation with RVSP estimated at 32 mmHg.   -Pacemaker / ICD lead was visualized in the right heart.   Cath:     Medication:  Current Outpatient Medications   Medication Sig Dispense Refill    amoxicillin (AMOXIL) 500 MG capsule 2000 mg one hour prior procedure ( 4 tabs) 4 capsule 3    lisinopril (PRINIVIL;ZESTRIL) 10 MG tablet Take 1 tablet by mouth daily 90 tablet 3    carvedilol (COREG) 6.25 MG tablet Take 1 tablet by mouth 2 times daily (with meals) 180 tablet 3    vitamin C (ASCORBIC ACID) 500 MG tablet Take 500 mg by mouth daily      Cholecalciferol (VITAMIN D PO) Take 1,000 Units by mouth daily       aspirin 81 MG tablet Take 81 mg by mouth daily. No current facility-administered medications for this visit. Thank you for allowing me to participate in the care of [facility]. Further evaluation will be based upon the patient's clinical course and testing results. All questions and concerns were addressed to the patient/family. Alternatives to my treatment were discussed. I have discussed the above stated plan and the patient verbalized understanding and agreed with the plan. Scribe attestation: This note was scribed in the presence of  Sadi Serrano MD by Sunil Argueta RN    I, Dr. Sadi Serrano personally performed the services described in this documentation as scribed by RN in my presence, and it is both accurate and complete.          NOTE: This report was transcribed using voice recognition software. Every effort was made to ensure accuracy, however, inadvertent computerized transcription errors may be present.        Sadi eSrrano MD, [place] [address]   Office: [phone]

## 2022-02-09 NOTE — PROGRESS NOTES
Patient comes in for programming evaluation for his defibrillator. All sensing and pacing parameters are within normal range. Battery life 9.9 years   <0.1%. 3 AF episodes. Appear to be ectopy. PVC Runs (2-4 beats) 1.2 per hour  PVC Singles 5.9 per hour  Patient remains on Coreg. No changes need to be made at this time. Please see interrogation for more detail. Optivol is WNL. Patient will see Dr. Rod Bring today and follow up in 3 months in office or remotely.

## 2022-02-15 ENCOUNTER — HOSPITAL ENCOUNTER (OUTPATIENT)
Dept: NON INVASIVE DIAGNOSTICS | Age: 72
Discharge: HOME | End: 2022-02-15
Payer: MEDICARE

## 2022-02-15 DIAGNOSIS — I35.9 AORTIC VALVE DISEASE: ICD-10-CM

## 2022-02-15 DIAGNOSIS — I10 PRIMARY HYPERTENSION: ICD-10-CM

## 2022-02-15 DIAGNOSIS — I51.9 LV DYSFUNCTION: ICD-10-CM

## 2022-02-15 DIAGNOSIS — R94.31 EKG ABNORMALITIES: ICD-10-CM

## 2022-02-15 DIAGNOSIS — I42.8 OTHER CARDIOMYOPATHY (HCC): ICD-10-CM

## 2022-02-15 DIAGNOSIS — I49.3 PVC (PREMATURE VENTRICULAR CONTRACTION): ICD-10-CM

## 2022-02-15 LAB
LV EF: 40 %
LVEF MODALITY: NORMAL

## 2022-02-15 PROCEDURE — 3430000000 HC RX DIAGNOSTIC RADIOPHARMACEUTICAL: Performed by: INTERNAL MEDICINE

## 2022-02-15 PROCEDURE — 78472 GATED HEART PLANAR SINGLE: CPT | Performed by: INTERNAL MEDICINE

## 2022-02-15 PROCEDURE — A9560 TC99M LABELED RBC: HCPCS | Performed by: INTERNAL MEDICINE

## 2022-02-15 RX ADMIN — Medication 25 MILLICURIE: at 14:13

## 2022-02-17 ENCOUNTER — TELEPHONE (OUTPATIENT)
Dept: CARDIOLOGY CLINIC | Age: 72
End: 2022-02-17

## 2022-02-17 NOTE — TELEPHONE ENCOUNTER
----- Message from Albania Dumont MD sent at 2/16/2022  6:07 PM EST -----  Please let him know EF is 40   Continue meds  ----- Message -----  From: Sergio Kim Incoming Cardiovascular Orders From [facility]  Sent: 2/15/2022   3:59 PM EST  To: Albania Dumont MD

## 2022-03-14 ENCOUNTER — NURSE ONLY (OUTPATIENT)
Dept: CARDIOLOGY CLINIC | Age: 72
End: 2022-03-14
Payer: MEDICARE

## 2022-03-14 DIAGNOSIS — I42.8 NONISCHEMIC CARDIOMYOPATHY (HCC): ICD-10-CM

## 2022-03-14 DIAGNOSIS — Z95.810 ICD (IMPLANTABLE CARDIOVERTER-DEFIBRILLATOR) IN PLACE: ICD-10-CM

## 2022-03-15 PROCEDURE — 93296 REM INTERROG EVL PM/IDS: CPT | Performed by: INTERNAL MEDICINE

## 2022-03-15 PROCEDURE — 93295 DEV INTERROG REMOTE 1/2/MLT: CPT | Performed by: INTERNAL MEDICINE

## 2022-03-28 RX ORDER — LISINOPRIL 10 MG/1
10 TABLET ORAL DAILY
Qty: 90 TABLET | Refills: 3 | Status: SHIPPED | OUTPATIENT
Start: 2022-03-28 | End: 2022-09-19 | Stop reason: SDUPTHER

## 2022-03-28 NOTE — TELEPHONE ENCOUNTER
Received refill request for Lisinopril from [facility] DR JASMIN MEDINA.      Last OV: 09/13/2021 w/ DKW     Last Labs: 10/27/2021 CMP    Last Refill: 09/13/2021 #90 w/ 3 refills     Next Appointment: 09/19/2022 w/ REYMUNDO

## 2022-06-13 ENCOUNTER — NURSE ONLY (OUTPATIENT)
Dept: CARDIOLOGY CLINIC | Age: 72
End: 2022-06-13
Payer: MEDICARE

## 2022-06-13 DIAGNOSIS — Z95.810 ICD (IMPLANTABLE CARDIOVERTER-DEFIBRILLATOR) IN PLACE: ICD-10-CM

## 2022-06-13 DIAGNOSIS — I42.8 NONISCHEMIC CARDIOMYOPATHY (HCC): ICD-10-CM

## 2022-06-14 PROCEDURE — 93295 DEV INTERROG REMOTE 1/2/MLT: CPT | Performed by: INTERNAL MEDICINE

## 2022-06-14 PROCEDURE — 93296 REM INTERROG EVL PM/IDS: CPT | Performed by: INTERNAL MEDICINE

## 2022-06-14 NOTE — PROGRESS NOTES
We received remote transmission from patient's monitor at home. Transmission shows normal sensing and pacing function. AF is not real. EP physician will review. See interrogation under cardiology tab in the [address] [place] [address] [address] field for more details. Optivol is within normal range.

## 2022-08-10 NOTE — PROGRESS NOTES
[facility]   Electrophysiology Follow Up  Date: 2022      CC: Cardiomyopathy/PVC's   HPI: Phylicia Esquivel is a 74 y.o. male who has a history of AVR(repair at [facility] [id number], redo1/16)cardiomyopathy, (AICD second device--battery , was turned off, at the time  His EF was  45%), HTN, HLD. He is s/p PVC ablation and single chamber ICD Gen change 19. Muga scan 02/15/2022 shows improved EF - 40%     Interval History:  Tami Sylvester presents today in follow up. He is doing well today. He has lost about 20 pounds he feels fine. Assessment and plan:      Nonischemic cardiomyopathy   - 02/15/2022 Muga scan 40%    - LVEF 2021 25-30%   - Continue coreg and lisinopril    - S/p PVC ablation and single chamber ICD Gen change 19   - 6/3/2020 echo EF 25 % but upon further visualization (myself) the EF appears unchanged from 2019   - 19 echo EF 30-35 %      - 19  Echo  EF 30-35%   - 24 hour holter 2019 PVC burden 32.1%    - 16 echo EF 25%   - follows with Dr. Smith Marin      - pt is exercising on elliptical machine   - instructed to monitor symptoms. - discussed BiV upgrade if he becomes symptomatic with heart failure. Even though his QRS complex is wide it is nonspecific and given the fact that he has no symptoms resynchronization therapy may not be helpful however if he starts having symptoms I would consider it a strongly. His EF is improved. He remains asymptomatic  Given the extremely wide QRS of 180 ms, I am concerned about worsening of LV function over time. We can monitor his LV function with echo or MUGA over time to have a better assessment and longitudinal follow-up. And if his symptoms or LV function change, we can consider upgrading to CRT-D. Fortunately at this point he seems to be doing better and therefore there is no need for any intervention. We will continue medical therapy as above.     PVC's   -ECG today Sinus rhythm   - PVC burden on interrogation today is 23.9  per hour , up from 5.9     -continue coreg 6.25 mg BID    -24 hour holter 2/2019 PVC burden 32.1%   -S/p PVC ablation 8/13/19. Felt better afterwards  His PVC burden has decreased to 5.9/h even further . PVC burden is stable. Continue medical therapy with beta-blocker. Single chamber ICD (Gen change 8/13/19)   The CIED was interrogated and programmed and I supervised and reviewed all the data. All findings and changes are in device interrogation sheet and reflect my personal interpretation and changes and is scanned to Epic. 9.3 years remaining,    < 0.1% - 5 afib episodes last on 04/26/2022 longest 42 minutes. Optivol WNL     Aortic valve disorder/Aortic Valve repair 1996, redo 1/16   Gradient is 31 mmHg consistent with at least moderate stenosis but  fairly unchanged from his previous echos. Bioprosthetic valve is otherwise functioning normally. HTN  -borderline 134/82 - pt states SBP runs 118 to 120 at home. -BP goal <130/80  -Home BP monitoring encouraged, printed information provided on how to accurately measure BP at home.  -Counseled to follow a low salt diet to assure blood pressure remains controlled for cardiovascular risk factor modification.   -The patient is counseled to get regular exercise 3-5 times per week and maintain a healthy weight reduce cardiovascular risk factors. - continue lisinopril, coreg  - follows with Dr. Ethan Delcid   - no meds - prefers to control with diet     - lipids 10/27/2021   - follows with Dr. Bhavin Cagle         Plan:  Follow up in one year   Follow up with Dr. Delcid Just as scheduled     .         Past Medical History:   Diagnosis Date    Abnormal PSA     Elevated LDL cholesterol level     Hypertension     Non-ischemic cardiomyopathy (HCC)     Severe aortic stenosis     Vitamin D deficiency         Past Surgical History:   Procedure Laterality Date    AORTIC VALVE REPLACEMENT  01/06/2016    Dr. Tash Higgins - 27 mm Mosaic Ultra porcine valve [id number]    AORTIC VALVULOPLASTY  1996    AV repair @ [facility]    APPENDECTOMY      CARDIAC DEFIBRILLATOR PLACEMENT  [phone] 2005    for CM/ now turned off does not need    CATARACT REMOVAL WITH IMPLANT Left     CORONARY ARTERY BYPASS GRAFT      HERNIA REPAIR Right 2010       Allergies:  No Known Allergies    Social History:   reports that he has never smoked. He has never used smokeless tobacco. He reports current alcohol use of about 3.0 - 4.0 standard drinks per week. He reports that he does not use drugs. Family History:     Reviewed. Denies family history of sudden cardiac death, arrhythmia, premature CAD    Review of System:  All other systems reviewed and are negative except for that noted above. Pertinent negatives are:   General: negative for fever, chills   Ophthalmic ROS: negative for - eye pain or loss of vision  ENT ROS: negative for - headaches, sore throat   Respiratory: negative for - cough, sputum  Cardiovascular: Reviewed in HPI  Gastrointestinal: negative for - abdominal pain, diarrhea, N/V  Hematology: negative for - bleeding, blood clots, bruising or jaundice  Genito-Urinary:  negative for - Dysuria or incontinence  Musculoskeletal: negative for - Joint swelling, muscle pain  Neurological: negative for - confusion, dizziness, headaches   Psychiatric: No anxiety, no depression. Dermatological: negative for - rash    Physical Examination:  Vitals:    08/11/22 0757   BP: 134/82   Pulse: 65   SpO2: 99%        Wt Readings from Last 3 Encounters:   08/11/22 201 lb 9.6 oz (91.4 kg)   02/09/22 217 lb 9.6 oz (98.7 kg)   10/20/21 223 lb 6.4 oz (101.3 kg)        Constitutional: Oriented. No distress. Head: Normocephalic and atraumatic. Mouth/Throat: Oropharynx is clear and moist.   Eyes: Conjunctivae normal. EOM are normal.   Neck: Neck supple. No rigidity. No JVD present. Cardiovascular: Normal rate, regular rhythm, S1&S2. Pulmonary/Chest: Bilateral respiratory sounds.  No wheezes, No rhonchi. Abdominal: Soft. Bowel sounds present. No distension, No tenderness. Musculoskeletal: No tenderness. No edema    Lymphadenopathy: Has no cervical adenopathy. Neurological: Alert and oriented. Cranial nerve appears intact, No Gross deficit   Skin: Skin is warm and dry. No rash noted. Psychiatric: Has a normal behavior       Labs, diagnostic and imaging results reviewed. Reviewed. Lab Results   Component Value Date/Time    CREATININE 1.1 10/27/2021 08:47 AM    CREATININE 1.1 10/09/2020 08:45 AM    AST 19 10/27/2021 08:47 AM    ALT 17 10/27/2021 08:47 AM     ECG 8/11/22  SR   487  QTcH, 182 QRS    Muga 02/15/2022   Conclusions        MUGA Procedure descriptor    The patient''s red blood cells were labeled with technetium 99m using the    modified in vivo technique. Imaging was performed at rest by planar    technique in multiple views including [place], Anterior, and Lt. Lateral.        MUGA Conclusions    Abnormal resting left ventricular function with mild-moderate global    hypokinesis and EF= 40% . Echo 09/13/2021  Summary   -Left ventricular cavity size is normal with mild concentric left   ventricular hypertrophy.   -Overall left ventricular systolic function appears severely reduced with an   ejection fraction in the 25-30% range.   -There is moderate to severe diffuse hypokinesis. -Grade II diastolic dysfunction with elevated LV filling   pressures. E/e\"=11.45.   -Mild mitral regurgitation.   -A bioprosthetic artificial aortic valve appears well seated with elevated   velocities/gradients, maximum velocity of 3.5m/s and a mean gradient of   31mmHg. -Appears stable when compared to previous echocardiogram from 6/3/2020.   -Mild tricuspid regurgitation.   -Estimated pulmonary artery systolic pressure is borderline elevated at 37   mmHg assuming a right atrial pressure of 3 mmHg. -The left atrium is mildly dilated. -Right ventricular systolic function is mildly reduced . -Pacemaker / ICD lead is visualized in the right heart     Echo 6/3/2020  Summary   -Left ventricular cavity size is mildly dilated with borderline concentric   left ventricular hypertrophy.   -Overall left ventricular systolic function appears severely reduced with an   ejection fraction in the 25% range. 6/3/2020 echo EF 25 % but upon further visualization the EF appears unchanged from 11/2019   -There is moderate to severe diffuse hypokinesis with akinesis of the apex. -Grade II diastolic dysfunction with elevated LV filling   pressures. E/e\"=13.9.   -Mild mitral regurgitation.   -A bioprosthetic artificial aortic valve appears well seated with a maximum   velocity of 3.6m/s and a mean gradient of 31mmHg. This suggests at least   moderate stenosis. This is fairly similar to prior studies.   -Trivial aortic regurgitation.   -Mild tricuspid regurgitation.   -Estimated pulmonary artery systolic pressure is mildly elevated at 35 mmHg   assuming a right atrial pressure of 3 mmHg. -The left atrium is dilated. -Pacemaker / ICD lead is visualized in the right heart. Echo 11/13/19  Summary   -Global left ventricular function is moderately decreased with ejection   fraction estimated from 30 % to 35 %. E/e'= 12.25   -There is moderate diffuse hypokinesis.   -Indeterminate diastolic function. -Mild mitral regurgitation is present.   -A bioprosthetic artificial aortic valve appears well seated with a maximum   velocity of 3.71m/s and a mean gradient of 27mmHg. -Mild aortic regurgitation. P1/2t= 724   -Mild tricuspid regurgitation. PASP = 34 mmHg. Echo: 2/7/19  Summary   -Left ventricular cavity size is moderately dilated. Normal left ventricular   wall thickness. Overall left ventricular systolic function appears severely   reduced. There is severe diffuse hypokinesis. Ejection fraction is visually   estimated to be 30-35%. -Grade II diastolic dysfunction with elevated LV filling pressures. E/e\"=27.25   -A porcine aortic valve appears well seated with a maximum gradient of 44   mmHg and a mean gradient of 26 mmHg. Mild aortic regurgitation.   -Mild mitral regurgitation is present.   -There is mild tricuspid regurgitation with RVSP estimated at 32 mmHg.   -Pacemaker / ICD lead was visualized in the right heart. Cath:     Medication:  Current Outpatient Medications   Medication Sig Dispense Refill    lisinopril (PRINIVIL;ZESTRIL) 10 MG tablet Take 1 tablet by mouth daily 90 tablet 3    amoxicillin (AMOXIL) 500 MG capsule 2000 mg one hour prior procedure ( 4 tabs) 4 capsule 3    carvedilol (COREG) 6.25 MG tablet Take 1 tablet by mouth 2 times daily (with meals) 180 tablet 3    vitamin C (ASCORBIC ACID) 500 MG tablet Take 500 mg by mouth daily      Cholecalciferol (VITAMIN D PO) Take 1,000 Units by mouth daily       aspirin 81 MG tablet Take 81 mg by mouth daily. No current facility-administered medications for this visit. Thank you for allowing me to participate in the care of [address] [place] [address]. Further evaluation will be based upon the patient's clinical course and testing results. All questions and concerns were addressed to the patient/family. Alternatives to my treatment were discussed. I have discussed the above stated plan and the patient verbalized understanding and agreed with the plan. Scribe attestation: This note was scribed in the presence of  Dennise Councilman MD by Nikhil Conklin RN    I, Dr. Dennise Councilman personally performed the services described in this documentation as scribed by RN in my presence, and it is both accurate and complete. NOTE: This report was transcribed using voice recognition software. Every effort was made to ensure accuracy, however, inadvertent computerized transcription errors may be present.        Dennise Councilman, MD, [facility] [address] [facility]   Office: [phone]

## 2022-08-11 ENCOUNTER — NURSE ONLY (OUTPATIENT)
Dept: CARDIOLOGY CLINIC | Age: 72
End: 2022-08-11
Payer: MEDICARE

## 2022-08-11 ENCOUNTER — OFFICE VISIT (OUTPATIENT)
Dept: CARDIOLOGY CLINIC | Age: 72
End: 2022-08-11
Payer: MEDICARE

## 2022-08-11 VITALS
HEART RATE: 65 BPM | HEIGHT: 73 IN | SYSTOLIC BLOOD PRESSURE: 134 MMHG | WEIGHT: 201.6 LBS | DIASTOLIC BLOOD PRESSURE: 82 MMHG | BODY MASS INDEX: 26.72 KG/M2 | OXYGEN SATURATION: 99 %

## 2022-08-11 DIAGNOSIS — I35.9 AORTIC VALVE DISORDER: Chronic | ICD-10-CM

## 2022-08-11 DIAGNOSIS — I49.3 PVC (PREMATURE VENTRICULAR CONTRACTION): ICD-10-CM

## 2022-08-11 DIAGNOSIS — I10 PRIMARY HYPERTENSION: ICD-10-CM

## 2022-08-11 DIAGNOSIS — I42.8 NONISCHEMIC CARDIOMYOPATHY (HCC): Primary | ICD-10-CM

## 2022-08-11 DIAGNOSIS — I42.9 PRIMARY CARDIOMYOPATHY (HCC): Primary | Chronic | ICD-10-CM

## 2022-08-11 DIAGNOSIS — Z95.810 ICD (IMPLANTABLE CARDIOVERTER-DEFIBRILLATOR) IN PLACE: Chronic | ICD-10-CM

## 2022-08-11 DIAGNOSIS — Z95.810 ICD (IMPLANTABLE CARDIOVERTER-DEFIBRILLATOR) IN PLACE: ICD-10-CM

## 2022-08-11 DIAGNOSIS — E78.49 OTHER HYPERLIPIDEMIA: ICD-10-CM

## 2022-08-11 PROCEDURE — 99214 OFFICE O/P EST MOD 30 MIN: CPT | Performed by: INTERNAL MEDICINE

## 2022-08-11 PROCEDURE — 93282 PRGRMG EVAL IMPLANTABLE DFB: CPT | Performed by: INTERNAL MEDICINE

## 2022-08-11 PROCEDURE — 1123F ACP DISCUSS/DSCN MKR DOCD: CPT | Performed by: INTERNAL MEDICINE

## 2022-08-11 PROCEDURE — 93290 INTERROG DEV EVAL ICPMS IP: CPT | Performed by: INTERNAL MEDICINE

## 2022-08-11 NOTE — PROGRESS NOTES
POST INJECTION EVALUATION, no signs of new infection, tear, RD, VF, EOM, CNS, Vascular or other problems or side effect from previous injection(s). Patient comes in for programming evaluation for his defibrillator. All sensing and pacing parameters are within normal range. Battery life 9.3 years   <0.1%. 5 AF episodes noted. Appear to be ectopy. PVC Runs (2-4 beats) 1.7 per hour  PVC Singles 23.9 per hour  Patient remains on Coreg. No changes need to be made at this time. Please see interrogation for more detail. Optivol is WNL. Patient will see Dr. Sabi Ro today and follow up in 3 months in office or remotely.

## 2022-09-12 ENCOUNTER — NURSE ONLY (OUTPATIENT)
Dept: CARDIOLOGY CLINIC | Age: 72
End: 2022-09-12
Payer: MEDICARE

## 2022-09-12 DIAGNOSIS — I42.9 PRIMARY CARDIOMYOPATHY (HCC): Primary | Chronic | ICD-10-CM

## 2022-09-12 DIAGNOSIS — Z95.810 ICD (IMPLANTABLE CARDIOVERTER-DEFIBRILLATOR) IN PLACE: ICD-10-CM

## 2022-09-12 PROCEDURE — 93296 REM INTERROG EVL PM/IDS: CPT | Performed by: INTERNAL MEDICINE

## 2022-09-12 PROCEDURE — 93295 DEV INTERROG REMOTE 1/2/MLT: CPT | Performed by: INTERNAL MEDICINE

## 2022-09-13 NOTE — PROGRESS NOTES
We received remote transmission from patient's monitor at home. Transmission shows normal sensing and pacing function. EP physician will review. See interrogation under cardiology tab in the [address] [facility] [address] field for more details. Optivol is within normal range. .0%   0.0%    End of 91-day monitoring period 9-12-22.

## 2022-09-15 ASSESSMENT — ENCOUNTER SYMPTOMS
COUGH: 0
NAUSEA: 0
ABDOMINAL DISTENTION: 0
PHOTOPHOBIA: 0
CHEST TIGHTNESS: 0
SHORTNESS OF BREATH: 0
ABDOMINAL PAIN: 0
BLOOD IN STOOL: 0

## 2022-09-15 NOTE — PROGRESS NOTES
[address]  9/15/22  Referring: Ranulfo Sebastian NP    REASON FOR CONSULT/CHIEF COMPLAINT/HPI     Reason for visit/ Chief complaint  Follow up  AVR/Cardiomyopathy   HPI Gearline Aase is a 74 y.o. seen in follow up for management of AVR and bicuspid aortic valve, non-ischemic cardiomyopathy, PVC ablation, hypertension, hyperlipidemia. He had normal coronaries at the time of his angiogram.   Works with [employer] department 2 days a month    He has been seen by Dr Romeo Purvis for follow up after an echo with low EF of 25-30%. Discussed BiV upgrade with Dr Romeo Purvis if he becomes symptomatic with heart failure. He had a Muga scan done in February and it showed improved EF of 40%. Today he       Patient is compliant with medications and is tolerating them well without side effects     HISTORY/ALLERGIES/ROS     MedHx:  has a past medical history of Abnormal PSA, Elevated LDL cholesterol level, Hypertension, Non-ischemic cardiomyopathy ([address] Severe aortic stenosis, and Vitamin D deficiency. SurgHx:  has a past surgical history that includes hernia repair (Right, 2010); Aortic valvuloplasty (1996); Cardiac defibrillator placement (1996/ 2005); Aortic valve replacement (01/06/2016); Coronary artery bypass graft; Cataract removal with implant (Left); and Appendectomy. SocHx:  reports that he has never smoked. He has never used smokeless tobacco. He reports current alcohol use of about 3.0 - 4.0 standard drinks per week. He reports that he does not use drugs. FamHx: No family history of premature coronary artery disease, sudden death, or aneurysm  Allergies: Patient has no known allergies. ROS:   Review of Systems   Constitutional:  Negative for activity change, diaphoresis, fatigue and fever. HENT:  Negative for congestion and ear discharge. Eyes:  Negative for photophobia and visual disturbance. Respiratory:  Negative for cough, chest tightness and shortness of breath.     Cardiovascular:  Negative for chest pain and palpitations. Gastrointestinal:  Negative for abdominal distention, abdominal pain, blood in stool and nausea. Endocrine: Negative for cold intolerance and polydipsia. Genitourinary:  Negative for difficulty urinating and flank pain. Musculoskeletal:  Positive for arthralgias and myalgias. Skin:  Negative for rash and wound. Allergic/Immunologic: Negative for environmental allergies and immunocompromised state. Neurological:  Negative for dizziness and headaches. Hematological:  Negative for adenopathy. Does not bruise/bleed easily. Psychiatric/Behavioral:  Negative for confusion. The patient is not hyperactive. MEDICATIONS      Prior to Admission medications    Medication Sig Start Date End Date Taking? Authorizing Provider   lisinopril (PRINIVIL;ZESTRIL) 10 MG tablet Take 1 tablet by mouth daily 3/28/22   YouLike, DO   amoxicillin (AMOXIL) 500 MG capsule 2000 mg one hour prior procedure ( 4 tabs) 1/5/22   [facility], DO   carvedilol (COREG) 6.25 MG tablet Take 1 tablet by mouth 2 times daily (with meals) 9/13/21   YouLike, DO   vitamin C (ASCORBIC ACID) 500 MG tablet Take 500 mg by mouth daily    Historical Provider, MD   Cholecalciferol (VITAMIN D PO) Take 1,000 Units by mouth daily     Historical Provider, MD   aspirin 81 MG tablet Take 81 mg by mouth daily. Historical Provider, MD       PHYSICAL EXAM        There were no vitals filed for this visit.          Gen Alert, cooperative, no distress Heart  Irregularly irregular, 1/6 murmur   Head Normocephalic, atraumatic, no abnormalities Abd  Soft, NT, +BS, no mass, no OM   Eyes PERRLA, conj/corn clear Ext  Ext nl, AT, no C/C, no edema   Nose Nares normal, no drain age, Non-tender Pulse 2+ and symmetric   Throat Lips, mucosa, tongue normal Skin Color/text/turg nl, no rash/lesions   Neck S/S, TM, NT, no bruit Psych Nl mood and affect   Lung CTA-B, unlabored, no DTP     Ch wall NT, no deform       LABS and Imaging     Relevant and available CV data reviewed  Echo 9/13/21  Summary   -Left ventricular cavity size is normal with mild concentric left   ventricular hypertrophy.   -Overall left ventricular systolic function appears severely reduced with an   ejection fraction in the 25-30% range.   -There is moderate to severe diffuse hypokinesis. -Grade II diastolic dysfunction with elevated LV filling   pressures. E/e\"=11.45.   -Mild mitral regurgitation.   -A bioprosthetic artificial aortic valve appears well seated with elevated   velocities/gradients, maximum velocity of 3.5m/s and a mean gradient of   31mmHg. -Appears stable when compared to previous echocardiogram from 6/3/2020.   -Mild tricuspid regurgitation.   -Estimated pulmonary artery systolic pressure is borderline elevated at 37   mmHg assuming a right atrial pressure of 3 mmHg. -The left atrium is mildly dilated. -Right ventricular systolic function is mildly reduced .   -Pacemaker / ICD lead is visualized in the right heart. Echo 6/3/2020 -Left ventricular cavity size is mildly dilated with borderline concentric   left ventricular hypertrophy. -Overall left ventricular systolic function appears severely reduced with an   ejection fraction in the 25% range.  -There is moderate to severe diffuse hypokinesis with akinesis of the apex. -Grade II diastolic dysfunction with elevated LV filling  pressures. E/e\"=13.9.  -Mild mitral regurgitation.  -A bioprosthetic artificial aortic valve appears well seated with a maximum   velocity of 3.6m/s and a mean gradient of 31mmHg. This suggests at least  moderate stenosis. This is fairly similar to prior studies.  -Trivial aortic regurgitation.  -Mild tricuspid regurgitation.  -Estimated pulmonary artery systolic pressure is mildly elevated at 35 mmHg  assuming a right atrial pressure of 3 mmHg. -The left atrium is dilated. -Pacemaker / ICD lead is visualized in the right heart.     Muga scan 2/15/2022- changes    4. Single chamber ICD  -  generator change 8/13/19  -  no shocks  -  history of vf  Plan  - continue above  - instructed to avoid qtc prolonging medications, abx and supplements. Call with any med questions      5. Essential Hypertension  -  BP today ***  - on coreg 6.25  bid,  lisinopril 10 mg daily  - stable  Plan  - continue coreg, lisinopril       6. Mixed Hyperlipidemia  - 2016 lipitor recommended, but he wanted diet control  - 2020  not to goal   - 10/27/21   Plan  - prefers to control lipids with diet    7.  overweight  - not interested in seeing a dietician  - will attempt to lose weight with dietary changes   - goal weight next visit is 210  Plan  -    Patient counseled on lifestyle modification, diet, and exercise.   Recommend increase to 4 days a week on elliptical, and use hand weights    Follow Up: ***      Dr. Sagar Osullivan    ***

## 2022-09-16 ASSESSMENT — ENCOUNTER SYMPTOMS
ABDOMINAL DISTENTION: 0
NAUSEA: 0
CHEST TIGHTNESS: 0
ABDOMINAL PAIN: 0
PHOTOPHOBIA: 0
SHORTNESS OF BREATH: 0
COUGH: 0
BLOOD IN STOOL: 0

## 2022-09-16 NOTE — PROGRESS NOTES
[address]  9/19/22  Referring: Ramona Gaspar NP    REASON FOR CONSULT/CHIEF COMPLAINT/HPI     Reason for visit/ Chief complaint  Follow up  AVR/Cardiomyopathy   HPI Elroy Magdaleno is a 74 y.o. seen in follow up for management of AVR and bicuspid aortic valve, non-ischemic cardiomyopathy, PVC ablation, hypertension, hyperlipidemia. He had normal coronaries at the time of his angiogram.   Works with [employer] department 2 days a month    He has been seen by Dr Alejandro Weiss for follow up after an echo with low EF of 25-30%. Discussed BiV upgrade with Dr Alejandro Weiss if he becomes symptomatic with heart failure. He had a Muga scan done in February and it showed improved EF of 40%. In the interval since his last visit he lost his wife to uterine cancer. He was able to care for her at home until she passed  He has lost 32 pounds since last visit by cutting out on the sweets and not snacking. Today he is golfing twice a week, rides a cart. Now that he has more time he is thinking about working out three times a week at the gym. He is going to get more active now that he has more time to himself. No chest pain, shortness of breath palpitations or dizziness. No edema since weight loss. He has no trouble sleeping, no orthopnea    Patient is compliant with medications and is tolerating them well without side effects     HISTORY/ALLERGIES/ROS     MedHx:  has a past medical history of Abnormal PSA, Elevated LDL cholesterol level, Hypertension, Non-ischemic cardiomyopathy ([address] Severe aortic stenosis, and Vitamin D deficiency. SurgHx:  has a past surgical history that includes hernia repair (Right, 2010); Aortic valvuloplasty (1996); Cardiac defibrillator placement (1996/ 2005); Aortic valve replacement (01/06/2016); Coronary artery bypass graft; Cataract removal with implant (Left); and Appendectomy. SocHx:  reports that he has never smoked.  He has never used smokeless tobacco. He reports current alcohol use of about 3.0 - 4.0 standard drinks per week. He reports that he does not use drugs. FamHx: No family history of premature coronary artery disease, sudden death, or aneurysm  Allergies: Patient has no known allergies. ROS:   Review of Systems   Constitutional:  Negative for activity change, diaphoresis, fatigue and fever. HENT:  Negative for congestion and ear discharge. Eyes:  Negative for photophobia and visual disturbance. Respiratory:  Negative for cough, chest tightness and shortness of breath. Cardiovascular:  Negative for chest pain and palpitations. Gastrointestinal:  Negative for abdominal distention, abdominal pain, blood in stool and nausea. Endocrine: Negative for cold intolerance and polydipsia. Genitourinary:  Negative for difficulty urinating and flank pain. Musculoskeletal:  Positive for arthralgias and myalgias. Skin:  Negative for rash and wound. Allergic/Immunologic: Negative for environmental allergies and immunocompromised state. Neurological:  Negative for dizziness and headaches. Hematological:  Negative for adenopathy. Does not bruise/bleed easily. Psychiatric/Behavioral:  Negative for confusion. The patient is not hyperactive. MEDICATIONS      Prior to Admission medications    Medication Sig Start Date End Date Taking? Authorizing Provider   lisinopril (PRINIVIL;ZESTRIL) 10 MG tablet Take 1 tablet by mouth daily 3/28/22  Yes Ulises Turner DO   amoxicillin (AMOXIL) 500 MG capsule 2000 mg one hour prior procedure ( 4 tabs) 1/5/22  Yes Ulises Turner DO   carvedilol (COREG) 6.25 MG tablet Take 1 tablet by mouth 2 times daily (with meals) 9/13/21  Yes Ulises Turner DO   vitamin C (ASCORBIC ACID) 500 MG tablet Take 500 mg by mouth daily   Yes Historical Provider, MD   Cholecalciferol (VITAMIN D PO) Take 1,000 Units by mouth daily    Yes Historical Provider, MD   aspirin 81 MG tablet Take 81 mg by mouth daily.    Yes Historical Provider, MD       PHYSICAL EXAM Vitals:    09/19/22 0825   BP: 134/82   Pulse: 56   SpO2: 98%    Weight: 199 lb 12.8 oz (90.6 kg)     Gen Alert, cooperative, no distress Heart  Irregularly irregular, 1/6 murmur   Head Normocephalic, atraumatic, no abnormalities Abd  Soft, NT, +BS, no mass, no OM   Eyes PERRLA, conj/corn clear Ext  Ext nl, AT, no C/C, no edema   Nose Nares normal, no drain age, Non-tender Pulse 2+ and symmetric   Throat Lips, mucosa, tongue normal Skin Color/text/turg nl, no rash/lesions   Neck S/S, TM, NT, no bruit Psych Nl mood and affect   Lung CTA-B, unlabored, no DTP     Ch wall Healed midline incision, prior right thoracotomy (missing rib), pacemaker       LABS and Imaging     Relevant and available CV data reviewed  Echo 9/13/21 -Left ventricular cavity size is normal with mild concentric left  ventricular hypertrophy.  -Overall left ventricular systolic function appears severely reduced with an  ejection fraction in the 25-30% range.  -There is moderate to severe diffuse hypokinesis. -Grade II diastolic dysfunction with elevated LV filling  pressures. E/e\"=11.45.  -Mild mitral regurgitation.   -A bioprosthetic artificial aortic valve appears well seated with elevated  velocities/gradients, maximum velocity of 3.5m/s and a mean gradient of  31mmHg. -Appears stable when compared to previous echocardiogram from 6/3/2020.   -Mild tricuspid regurgitation. -Estimated pulmonary artery systolic pressure is borderline elevated at 37  mmHg assuming a right atrial pressure of 3 mmHg. -The left atrium is mildly dilated. -Right ventricular systolic function is mildly reduced .   -Pacemaker / ICD lead is visualized in the right heart. Muga scan 2/15/2022-   Conclusions        MUGA Procedure descriptor    The patient''s red blood cells were labeled with technetium 99m using the    modified in vivo technique. Imaging was performed at rest by planar    technique in multiple views including NIK, Anterior, and Lt.  Lateral. instructed to avoid qtc prolonging medications, abx and supplements. Call with any med questions      5. Essential Hypertension  -    134/82  -     stable  Plan  -     continue coreg, lisinopril       6. Mixed Hyperlipidemia  -     2016 lipitor recommended, but he wanted diet control  -     2020  not to goal   -     10/27/21   Plan  -      prefers to control lipids with diet  -      will repeat lipids with pcp at next office visit, goal ldl less than 100. Start statin if elevated    Patient counseled on lifestyle modification, diet, and exercise.   Recommend increase to 4 days a week on elliptical, and use hand weights    Follow Up:       Dr. Britney Vargas:  I, Mehul Torres, am scribing for and in the presence of Fortino Marvin MD.   Renate Gregory 09/19/22 8:51 AM

## 2022-09-19 ENCOUNTER — OFFICE VISIT (OUTPATIENT)
Dept: CARDIOLOGY CLINIC | Age: 72
End: 2022-09-19
Payer: MEDICARE

## 2022-09-19 VITALS
HEIGHT: 73 IN | SYSTOLIC BLOOD PRESSURE: 134 MMHG | OXYGEN SATURATION: 98 % | HEART RATE: 56 BPM | WEIGHT: 199.8 LBS | DIASTOLIC BLOOD PRESSURE: 82 MMHG | BODY MASS INDEX: 26.48 KG/M2

## 2022-09-19 DIAGNOSIS — E78.2 MIXED HYPERLIPIDEMIA: ICD-10-CM

## 2022-09-19 DIAGNOSIS — Q23.1 BICUSPID AORTIC VALVE: ICD-10-CM

## 2022-09-19 DIAGNOSIS — I10 ESSENTIAL HYPERTENSION: ICD-10-CM

## 2022-09-19 DIAGNOSIS — I42.8 NONISCHEMIC CARDIOMYOPATHY (HCC): ICD-10-CM

## 2022-09-19 DIAGNOSIS — I35.0 NONRHEUMATIC AORTIC VALVE STENOSIS: Primary | ICD-10-CM

## 2022-09-19 DIAGNOSIS — Z95.810 ICD (IMPLANTABLE CARDIOVERTER-DEFIBRILLATOR) IN PLACE: ICD-10-CM

## 2022-09-19 DIAGNOSIS — I49.3 PVC (PREMATURE VENTRICULAR CONTRACTION): ICD-10-CM

## 2022-09-19 PROCEDURE — 99214 OFFICE O/P EST MOD 30 MIN: CPT | Performed by: INTERNAL MEDICINE

## 2022-09-19 PROCEDURE — 1123F ACP DISCUSS/DSCN MKR DOCD: CPT | Performed by: INTERNAL MEDICINE

## 2022-09-19 RX ORDER — CARVEDILOL 6.25 MG/1
6.25 TABLET ORAL 2 TIMES DAILY WITH MEALS
Qty: 180 TABLET | Refills: 3 | Status: SHIPPED | OUTPATIENT
Start: 2022-09-19

## 2022-09-19 RX ORDER — LISINOPRIL 10 MG/1
10 TABLET ORAL DAILY
Qty: 90 TABLET | Refills: 3 | Status: SHIPPED | OUTPATIENT
Start: 2022-09-19

## 2022-09-19 NOTE — LETTER
[address] [facility]  [address] [place] [address]  Phone: [phone]  Fax: [address], DO    2022     Maurice Gasca, [address] [address]    Patient: Lula Torres   MR Number: [id number]   YOB: 1950   Date of Visit: 2022       Dear Maurice Gasca: Thank you for referring Roc Cerna to me for evaluation/treatment. Below are the relevant portions of my assessment and plan of care. If you have questions, please do not hesitate to call me. I look forward to following Freddy along with you.     Sincerely,      Marta Healy, DO

## 2022-10-05 ENCOUNTER — HOSPITAL ENCOUNTER (OUTPATIENT)
Dept: CT IMAGING | Age: 72
Discharge: HOME | End: 2022-10-05
Payer: MEDICARE

## 2022-10-05 DIAGNOSIS — I35.0 NONRHEUMATIC AORTIC VALVE STENOSIS: ICD-10-CM

## 2022-10-05 DIAGNOSIS — Q23.1 BICUSPID AORTIC VALVE: ICD-10-CM

## 2022-10-05 PROCEDURE — 71250 CT THORAX DX C-: CPT

## 2022-10-10 ENCOUNTER — TELEPHONE (OUTPATIENT)
Dept: CARDIOLOGY CLINIC | Age: 72
End: 2022-10-10

## 2022-10-10 NOTE — TELEPHONE ENCOUNTER
----- Message from Denisse Gaines RN sent at 10/10/2022  8:43 AM EDT -----  Please let him know that his ct scan is stable, will repeat in one year  Incidental finding of gallstones, can discuss with pcp if having any gi issues.  I

## 2022-11-21 LAB — FECAL BLOOD IMMUNOCHEMICAL TEST: NEGATIVE

## 2022-11-21 SDOH — HEALTH STABILITY: PHYSICAL HEALTH: ON AVERAGE, HOW MANY DAYS PER WEEK DO YOU ENGAGE IN MODERATE TO STRENUOUS EXERCISE (LIKE A BRISK WALK)?: 5 DAYS

## 2022-11-21 SDOH — HEALTH STABILITY: PHYSICAL HEALTH: ON AVERAGE, HOW MANY MINUTES DO YOU ENGAGE IN EXERCISE AT THIS LEVEL?: 60 MIN

## 2022-11-21 ASSESSMENT — PATIENT HEALTH QUESTIONNAIRE - PHQ9
SUM OF ALL RESPONSES TO PHQ QUESTIONS 1-9: 0
SUM OF ALL RESPONSES TO PHQ QUESTIONS 1-9: 0
2. FEELING DOWN, DEPRESSED OR HOPELESS: 0
1. LITTLE INTEREST OR PLEASURE IN DOING THINGS: 0
SUM OF ALL RESPONSES TO PHQ9 QUESTIONS 1 & 2: 0
SUM OF ALL RESPONSES TO PHQ QUESTIONS 1-9: 0
SUM OF ALL RESPONSES TO PHQ QUESTIONS 1-9: 0

## 2022-11-21 ASSESSMENT — LIFESTYLE VARIABLES
HOW MANY STANDARD DRINKS CONTAINING ALCOHOL DO YOU HAVE ON A TYPICAL DAY: 1
HOW MANY STANDARD DRINKS CONTAINING ALCOHOL DO YOU HAVE ON A TYPICAL DAY: 1 OR 2
HOW OFTEN DO YOU HAVE SIX OR MORE DRINKS ON ONE OCCASION: 1
HOW OFTEN DO YOU HAVE A DRINK CONTAINING ALCOHOL: MONTHLY OR LESS
HOW OFTEN DO YOU HAVE A DRINK CONTAINING ALCOHOL: 2

## 2022-11-30 ENCOUNTER — TELEMEDICINE (OUTPATIENT)
Dept: INTERNAL MEDICINE CLINIC | Age: 72
End: 2022-11-30
Payer: MEDICARE

## 2022-11-30 DIAGNOSIS — R97.20 ABNORMAL PSA: ICD-10-CM

## 2022-11-30 DIAGNOSIS — Z00.00 MEDICARE ANNUAL WELLNESS VISIT, SUBSEQUENT: Primary | ICD-10-CM

## 2022-11-30 DIAGNOSIS — E78.49 OTHER HYPERLIPIDEMIA: ICD-10-CM

## 2022-11-30 DIAGNOSIS — Z12.11 SCREEN FOR COLON CANCER: ICD-10-CM

## 2022-11-30 DIAGNOSIS — I10 ESSENTIAL HYPERTENSION: ICD-10-CM

## 2022-11-30 PROCEDURE — 1123F ACP DISCUSS/DSCN MKR DOCD: CPT | Performed by: NURSE PRACTITIONER

## 2022-11-30 PROCEDURE — G0439 PPPS, SUBSEQ VISIT: HCPCS | Performed by: NURSE PRACTITIONER

## 2022-11-30 SDOH — ECONOMIC STABILITY: FOOD INSECURITY: WITHIN THE PAST 12 MONTHS, THE FOOD YOU BOUGHT JUST DIDN'T LAST AND YOU DIDN'T HAVE MONEY TO GET MORE.: NEVER TRUE

## 2022-11-30 SDOH — ECONOMIC STABILITY: FOOD INSECURITY: WITHIN THE PAST 12 MONTHS, YOU WORRIED THAT YOUR FOOD WOULD RUN OUT BEFORE YOU GOT MONEY TO BUY MORE.: NEVER TRUE

## 2022-11-30 ASSESSMENT — PATIENT HEALTH QUESTIONNAIRE - PHQ9
SUM OF ALL RESPONSES TO PHQ QUESTIONS 1-9: 0
1. LITTLE INTEREST OR PLEASURE IN DOING THINGS: 0
SUM OF ALL RESPONSES TO PHQ QUESTIONS 1-9: 0
SUM OF ALL RESPONSES TO PHQ QUESTIONS 1-9: 0
2. FEELING DOWN, DEPRESSED OR HOPELESS: 0
SUM OF ALL RESPONSES TO PHQ9 QUESTIONS 1 & 2: 0
SUM OF ALL RESPONSES TO PHQ QUESTIONS 1-9: 0

## 2022-11-30 ASSESSMENT — SOCIAL DETERMINANTS OF HEALTH (SDOH): HOW HARD IS IT FOR YOU TO PAY FOR THE VERY BASICS LIKE FOOD, HOUSING, MEDICAL CARE, AND HEATING?: NOT HARD AT ALL

## 2022-11-30 NOTE — PATIENT INSTRUCTIONS
Please get your fasting lab work (no food or drink for 10-12 hours prior besides water) completed M-F 730a-4p at our office. [facility] lab has walk-in hours available as well - no appointment is needed. We will call or mychart message you with your results. Personalized Preventive Plan for Miles Molina - 11/30/2022  Medicare offers a range of preventive health benefits. Some of the tests and screenings are paid in full while other may be subject to a deductible, co-insurance, and/or copay. Some of these benefits include a comprehensive review of your medical history including lifestyle, illnesses that may run in your family, and various assessments and screenings as appropriate. After reviewing your medical record and screening and assessments performed today your provider may have ordered immunizations, labs, imaging, and/or referrals for you. A list of these orders (if applicable) as well as your Preventive Care list are included within your After Visit Summary for your review. Other Preventive Recommendations:    A preventive eye exam performed by an eye specialist is recommended every 1-2 years to screen for glaucoma; cataracts, macular degeneration, and other eye disorders. A preventive dental visit is recommended every 6 months. Try to get at least 150 minutes of exercise per week or 10,000 steps per day on a pedometer . Order or download the FREE \"Exercise & Physical Activity: Your Everyday Guide\" from [employer]. Call 0-818.812.7040 or search [employer] online. You need 9648-8657 mg of calcium and 3107-6581 IU of vitamin D per day. It is possible to meet your calcium requirement with diet alone, but a vitamin D supplement is usually necessary to meet this goal.  When exposed to the sun, use a sunscreen that protects against both UVA and UVB radiation with an SPF of 30 or greater.  Reapply every 2 to 3 hours or after sweating, drying off with a towel, or swimming. Always wear a seat belt when traveling in a car. Always wear a helmet when riding a bicycle or motorcycle.

## 2022-11-30 NOTE — PROGRESS NOTES
Medicare Annual Wellness Visit    Allyn Smith is here virtually for Medicare AWV    Assessment & Plan   Medicare annual wellness visit, subsequent  Essential hypertension  -     Comprehensive Metabolic Panel; Future  Other hyperlipidemia  -     Lipid, Fasting; Future  Abnormal PSA  -     PSA, Prostatic Specific Antigen; Future  Screen for colon cancer    Recommendations for Preventive Services Due: see orders and patient instructions/AVS.  Recommended screening schedule for the next 5-10 years is provided to the patient in written form: see Patient Instructions/AVS.     Return for Medicare Annual Wellness Visit in 1 year. Subjective      Hypertension/CHF- sees cardiology- Dr. Cristy Evans and Dr. Jayla Schultz. Takes Coreg and lisinopril per cardiology. Denies side effects. Blood pressure stable. Denies chest pain, palpitations, shortness of breath, trouble breathing, lightheadedness, dizziness or blurred vision. Lost weight by cutting out sweets and staying active. Uses the treadmill. hyperlipidemia- exercises. Abnormal PSA- Was evaluated by a specialist. Has had biopsies with negative results. Asymptomatic. States he had insurance send him a stool sample kit and he mailed this back in. He states he will call in with results. Asymptomatic. 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 Then  of [employer]. -Lost his wife this year to Stage 4 uterine cancer. 2 sons. 2 grandchildren. For fun, he enjoys golf. Patient's complete Health Risk Assessment and screening values have been reviewed and are found in Flowsheets. The following problems were reviewed today and where indicated follow up appointments were made and/or referrals ordered.     Positive Risk Factor Screenings with Interventions:             General Health and ACP:  General  In general, how would you say your health is?: Excellent  In the past 7 days, have you experienced any of the following: New or Increased Pain, New or Increased Fatigue, Loneliness, Social Isolation, Stress or Anger?: (!) Yes  Select all that apply: (!) Loneliness  Do you get the social and emotional support that you need?: Yes  Do you have a Living Will?: Yes    Advance Directives       Power of  Living Will ACP-Advance Directive ACP-Power of     Not on File Filed on 12/12/15 Filed Not on File        General Health Risk Interventions:  Loneliness: patient declines any further intervention for this issue, states this is d/t his wife passing. However, he has lunch with friends, Kendra Pati frequently and goes to the gym a few times a week. Objective      Patient-Reported Vitals  Patient-Reported Systolic (Top): 625 mmHg  Patient-Reported Diastolic (Bottom): 70 mmHg  BP Observations: No, remote/electronic monitoring device was not used or able to be verified  Patient-Reported Pulse: 65     ^no access to other VS d/t VV per pt. Physical Exam  Constitutional:       General: He is not in acute distress. Appearance: Normal appearance. He is not ill-appearing. Pulmonary:      Effort: Pulmonary effort is normal. No respiratory distress. Skin:     Coloration: Skin is not jaundiced or pale. Neurological:      Mental Status: He is alert. Comments: No facial asymmetry noted   Psychiatric:         Mood and Affect: Mood normal.         No Known Allergies  Prior to Visit Medications    Medication Sig Taking? Authorizing Provider   lisinopril (PRINIVIL;ZESTRIL) 10 MG tablet Take 1 tablet by mouth daily Yes Yinka Ellis DO   carvedilol (COREG) 6.25 MG tablet Take 1 tablet by mouth 2 times daily (with meals) Yes Yinka Ellis DO   vitamin C (ASCORBIC ACID) 500 MG tablet Take 500 mg by mouth daily Yes Historical Provider, MD   Cholecalciferol (VITAMIN D PO) Take 1,000 Units by mouth daily  Yes Historical Provider, MD   aspirin 81 MG tablet Take 81 mg by mouth daily.  Yes Historical Provider, MD   amoxicillin (AMOXIL) 500 MG capsule 2000 mg one hour prior procedure ( 4 tabs)  Patient not taking: Reported on 11/30/2022  DO Perlita Garces (Including outside providers/suppliers regularly involved in providing care):   Patient Care Team:  Jessica Anglin, [facility] as PCP - General (Nurse Practitioner)  AMY Martinez - BILL as PCP - [facility]  Dain Teejda MD as Consulting Physician (Cardiology)  Yehuda Patel MD as Consulting Physician (Electrophysiology)     Reviewed and updated this visit:  Allergies  Meds           Madeleinejose a Emily, was evaluated through a synchronous (real-time) audio-video encounter. The patient (or guardian if applicable) is aware that this is a billable service, which includes applicable co-pays. This Virtual Visit was conducted with patient's (and/or legal guardian's) consent. The visit was conducted pursuant to the emergency declaration under the [address] [place] [address] [facility] authority and the [employer] General Act. Patient identification was verified, and a caregiver was present when appropriate. The patient was located at [facility]  Provider was located at Home ([facility]): [place].

## 2022-12-01 DIAGNOSIS — E78.49 OTHER HYPERLIPIDEMIA: ICD-10-CM

## 2022-12-01 DIAGNOSIS — R97.20 ABNORMAL PSA: ICD-10-CM

## 2022-12-01 DIAGNOSIS — I10 ESSENTIAL HYPERTENSION: ICD-10-CM

## 2022-12-01 LAB
A/G RATIO: 1.8 (ref 1.1–2.2)
ALBUMIN SERPL-MCNC: 4.2 G/DL (ref 3.4–5)
ALP BLD-CCNC: 76 U/L (ref 40–129)
ALT SERPL-CCNC: 20 U/L (ref 10–40)
ANION GAP SERPL CALCULATED.3IONS-SCNC: 11 MMOL/L (ref 3–16)
AST SERPL-CCNC: 21 U/L (ref 15–37)
BILIRUB SERPL-MCNC: 0.5 MG/DL (ref 0–1)
BUN BLDV-MCNC: 20 MG/DL (ref 7–20)
CALCIUM SERPL-MCNC: 9.2 MG/DL (ref 8.3–10.6)
CHLORIDE BLD-SCNC: 108 MMOL/L (ref 99–110)
CHOLESTEROL, FASTING: 174 MG/DL (ref 0–199)
CO2: 27 MMOL/L (ref 21–32)
CREAT SERPL-MCNC: 0.9 MG/DL (ref 0.8–1.3)
GFR SERPL CREATININE-BSD FRML MDRD: >60 ML/MIN/{1.73_M2}
GLUCOSE BLD-MCNC: 95 MG/DL (ref 70–99)
HDLC SERPL-MCNC: 49 MG/DL (ref 40–60)
LDL CHOLESTEROL CALCULATED: 108 MG/DL
POTASSIUM SERPL-SCNC: 4.9 MMOL/L (ref 3.5–5.1)
PROSTATE SPECIFIC ANTIGEN: 3.49 NG/ML (ref 0–4)
SODIUM BLD-SCNC: 146 MMOL/L (ref 136–145)
TOTAL PROTEIN: 6.6 G/DL (ref 6.4–8.2)
TRIGLYCERIDE, FASTING: 87 MG/DL (ref 0–150)
VLDLC SERPL CALC-MCNC: 17 MG/DL

## 2022-12-12 ENCOUNTER — NURSE ONLY (OUTPATIENT)
Dept: CARDIOLOGY CLINIC | Age: 72
End: 2022-12-12
Payer: MEDICARE

## 2022-12-12 DIAGNOSIS — I42.8 NONISCHEMIC CARDIOMYOPATHY (HCC): Primary | ICD-10-CM

## 2022-12-12 DIAGNOSIS — Z95.810 ICD (IMPLANTABLE CARDIOVERTER-DEFIBRILLATOR) IN PLACE: ICD-10-CM

## 2022-12-12 PROCEDURE — 93295 DEV INTERROG REMOTE 1/2/MLT: CPT | Performed by: INTERNAL MEDICINE

## 2022-12-12 PROCEDURE — 93296 REM INTERROG EVL PM/IDS: CPT | Performed by: INTERNAL MEDICINE

## 2022-12-13 NOTE — PROGRESS NOTES
We received remote transmission from patient's monitor at home. Transmission shows normal sensing and pacing function. AF is not real. EP physician will review. See interrogation under cardiology tab in the [address] [place] [address] [address] field for more details. Optivol is within normal range. .0%   < 0.1%    End of 91-day monitoring period 12-12-22.

## 2023-03-15 ENCOUNTER — NURSE ONLY (OUTPATIENT)
Dept: CARDIOLOGY CLINIC | Age: 73
End: 2023-03-15
Payer: MEDICARE

## 2023-03-15 DIAGNOSIS — Z95.810 ICD (IMPLANTABLE CARDIOVERTER-DEFIBRILLATOR) IN PLACE: ICD-10-CM

## 2023-03-15 DIAGNOSIS — I42.9 PRIMARY CARDIOMYOPATHY (HCC): Primary | Chronic | ICD-10-CM

## 2023-03-15 PROCEDURE — 93296 REM INTERROG EVL PM/IDS: CPT | Performed by: INTERNAL MEDICINE

## 2023-03-15 PROCEDURE — 93295 DEV INTERROG REMOTE 1/2/MLT: CPT | Performed by: INTERNAL MEDICINE

## 2023-03-16 NOTE — PROGRESS NOTES
We received remote transmission from patient's monitor at home. Transmission shows normal sensing and pacing function. EP physician will review. See interrogation under cardiology tab in the [address] [place] [address] [address] field for more details. Optivol is within normal range. .0%   < 0.1%    End of 91-day monitoring period 3-15-23.

## 2023-08-08 NOTE — PROGRESS NOTES
caffeine, and energy drinks as this may exacerbated ectopy and arrhythmia. - The patient is counseled to get regular exercise 3-5 times per week to control cardiovascular risk factors. All questions and concerns were addressed to the patient/family. Alternatives to my treatment were discussed. I have discussed the above stated plan and the patient verbalized understanding and agreed with the plan. Scribe attestation: This note was scribed in the presence of Grecia Alvarado MD by Sumi Luciano RN    I, Dr. Grecia Alvarado personally performed the services described in this documentation as scribed by RN in my presence, and it is both accurate and complete. NOTE: This report was transcribed using voice recognition software. Every effort was made to ensure accuracy, however, inadvertent computerized transcription errors may be present.      Grecia Alvarado MD, Олег Padilla [facility]   Office: [phone]  Fax: [phone]

## 2023-08-10 ENCOUNTER — NURSE ONLY (OUTPATIENT)
Dept: CARDIOLOGY CLINIC | Age: 73
End: 2023-08-10
Payer: MEDICARE

## 2023-08-10 ENCOUNTER — OFFICE VISIT (OUTPATIENT)
Dept: CARDIOLOGY CLINIC | Age: 73
End: 2023-08-10

## 2023-08-10 VITALS
WEIGHT: 207 LBS | HEIGHT: 73 IN | HEART RATE: 55 BPM | BODY MASS INDEX: 27.43 KG/M2 | DIASTOLIC BLOOD PRESSURE: 82 MMHG | OXYGEN SATURATION: 96 % | SYSTOLIC BLOOD PRESSURE: 146 MMHG

## 2023-08-10 DIAGNOSIS — I42.9 PRIMARY CARDIOMYOPATHY (HCC): Primary | Chronic | ICD-10-CM

## 2023-08-10 DIAGNOSIS — I10 ESSENTIAL HYPERTENSION: ICD-10-CM

## 2023-08-10 DIAGNOSIS — Z95.810 ICD (IMPLANTABLE CARDIOVERTER-DEFIBRILLATOR) IN PLACE: Chronic | ICD-10-CM

## 2023-08-10 DIAGNOSIS — I49.3 PVC (PREMATURE VENTRICULAR CONTRACTION): ICD-10-CM

## 2023-08-10 DIAGNOSIS — Z45.02 ICD (IMPLANTABLE CARDIOVERTER-DEFIBRILLATOR) BATTERY DEPLETION: ICD-10-CM

## 2023-08-10 DIAGNOSIS — I35.9 AORTIC VALVE DISORDER: Chronic | ICD-10-CM

## 2023-08-10 DIAGNOSIS — I42.8 NONISCHEMIC CARDIOMYOPATHY (HCC): ICD-10-CM

## 2023-08-10 DIAGNOSIS — Z95.810 ICD (IMPLANTABLE CARDIOVERTER-DEFIBRILLATOR) IN PLACE: Primary | Chronic | ICD-10-CM

## 2023-08-10 PROCEDURE — 93282 PRGRMG EVAL IMPLANTABLE DFB: CPT | Performed by: INTERNAL MEDICINE

## 2023-08-10 NOTE — PROGRESS NOTES
[employer] FCA for Increased Potential for Reduced Energy or No Energy Delivered During High Voltage Therapy When Programmed AX > B    8/10/2023-Changed All pathways per [employer] FCA to  B >AX

## 2023-09-19 ASSESSMENT — ENCOUNTER SYMPTOMS
NAUSEA: 0
CHEST TIGHTNESS: 0
COUGH: 0
ABDOMINAL DISTENTION: 0
PHOTOPHOBIA: 0
BLOOD IN STOOL: 0
ABDOMINAL PAIN: 0
SHORTNESS OF BREATH: 0

## 2023-09-19 NOTE — PROGRESS NOTES
[facility]  9/20/23  Referring: Ksenia Anand NP    REASON FOR CONSULT/CHIEF COMPLAINT/HPI     Reason for visit/ Chief complaint  Follow up  AVR/Cardiomyopathy   HPI Andres Mccloud is a 74 y.o. seen in follow up for management of AVR and bicuspid aortic valve, non-ischemic cardiomyopathy, PVC ablation, hypertension, hyperlipidemia. He had normal coronaries at the time of his angiogram.   Works with [employer] department 2 days a month. He has been seen by Dr Manisha Kulkarni for follow up after an echo with low EF of 25-30%. Discussed BiV upgrade with Dr Manisha Kulkarni if he becomes symptomatic with heart failure. He had a Muga scan done in February 2022 and it showed improved EF of 40%. Last year he lost his wife to uterine cancer. He feels as if he has appropriately grieved. Still misses her but feels like he has good coping skills. He enjoys spending time with his son who lives 3 miles away, has another son who has 2 children that he face times  with daily. Also golfs, and participates in his [employer]. He is enjoying life. Exercised at the gym three times a week and feels well when he does. Today he has no chest pain shortness of breath palpitations or dizziness. No shocks. He has no edema or orthopnea. Overall has improved exercise tolerance    Patient is compliant with medications and is tolerating them well without side effects     HISTORY/ALLERGIES/ROS     MedHx:  has a past medical history of Abnormal PSA, Elevated LDL cholesterol level, Hypertension, Non-ischemic cardiomyopathy ([address]), Severe aortic stenosis, and Vitamin D deficiency. SurgHx:  has a past surgical history that includes hernia repair (Right, 2010); Aortic valvuloplasty (1996); Cardiac defibrillator placement (1996/ 2005); Aortic valve replacement (01/06/2016); Coronary artery bypass graft; Cataract removal with implant (Left); and Appendectomy. SocHx:  reports that he has never smoked.  He has never used smokeless tobacco. He reports

## 2023-09-20 ENCOUNTER — OFFICE VISIT (OUTPATIENT)
Dept: CARDIOLOGY CLINIC | Age: 73
End: 2023-09-20

## 2023-09-20 VITALS
SYSTOLIC BLOOD PRESSURE: 120 MMHG | OXYGEN SATURATION: 98 % | HEIGHT: 73 IN | DIASTOLIC BLOOD PRESSURE: 82 MMHG | BODY MASS INDEX: 27.59 KG/M2 | WEIGHT: 208.2 LBS | HEART RATE: 64 BPM

## 2023-09-20 DIAGNOSIS — Z45.02 ICD (IMPLANTABLE CARDIOVERTER-DEFIBRILLATOR) BATTERY DEPLETION: ICD-10-CM

## 2023-09-20 DIAGNOSIS — I42.8 NONISCHEMIC CARDIOMYOPATHY (HCC): ICD-10-CM

## 2023-09-20 DIAGNOSIS — I42.9 PRIMARY CARDIOMYOPATHY (HCC): Primary | Chronic | ICD-10-CM

## 2023-09-20 DIAGNOSIS — I35.0 NONRHEUMATIC AORTIC VALVE STENOSIS: ICD-10-CM

## 2023-09-20 DIAGNOSIS — R06.02 SHORTNESS OF BREATH: ICD-10-CM

## 2023-09-20 DIAGNOSIS — Z95.810 ICD (IMPLANTABLE CARDIOVERTER-DEFIBRILLATOR) IN PLACE: Chronic | ICD-10-CM

## 2023-09-20 DIAGNOSIS — I10 ESSENTIAL HYPERTENSION: ICD-10-CM

## 2023-09-20 DIAGNOSIS — I49.3 PVC (PREMATURE VENTRICULAR CONTRACTION): ICD-10-CM

## 2023-09-20 DIAGNOSIS — E78.2 MIXED HYPERLIPIDEMIA: ICD-10-CM

## 2023-09-20 RX ORDER — AMOXICILLIN 500 MG/1
CAPSULE ORAL
Qty: 4 CAPSULE | Refills: 3 | Status: SHIPPED | OUTPATIENT
Start: 2023-09-20

## 2023-09-20 RX ORDER — LISINOPRIL 10 MG/1
10 TABLET ORAL DAILY
Qty: 90 TABLET | Refills: 3 | Status: SHIPPED | OUTPATIENT
Start: 2023-09-20

## 2023-09-20 RX ORDER — CARVEDILOL 6.25 MG/1
6.25 TABLET ORAL 2 TIMES DAILY WITH MEALS
Qty: 180 TABLET | Refills: 3 | Status: SHIPPED | OUTPATIENT
Start: 2023-09-20

## 2023-09-20 NOTE — PATIENT INSTRUCTIONS
Continue all medications  Call for any changes  Recommend getting children checked with an echo  Keep up the good exercise habits  Fasting lipid  Echo end of year  Call for palpitations or heart racing

## 2023-09-21 DIAGNOSIS — R06.02 SHORTNESS OF BREATH: ICD-10-CM

## 2023-09-21 DIAGNOSIS — I42.9 PRIMARY CARDIOMYOPATHY (HCC): Chronic | ICD-10-CM

## 2023-09-21 LAB — NT-PROBNP SERPL-MCNC: 487 PG/ML (ref 0–449)

## 2023-09-22 LAB
CHOLEST SERPL-MCNC: 175 MG/DL (ref 0–199)
HDLC SERPL-MCNC: 48 MG/DL (ref 40–60)
LDLC SERPL CALC-MCNC: 115 MG/DL
TRIGL SERPL-MCNC: 61 MG/DL (ref 0–150)
VLDLC SERPL CALC-MCNC: 12 MG/DL

## 2023-09-23 PROCEDURE — 93295 DEV INTERROG REMOTE 1/2/MLT: CPT | Performed by: INTERNAL MEDICINE

## 2023-09-23 PROCEDURE — 93296 REM INTERROG EVL PM/IDS: CPT | Performed by: INTERNAL MEDICINE

## 2023-10-04 RX ORDER — ATORVASTATIN CALCIUM 20 MG/1
20 TABLET, FILM COATED ORAL DAILY
Qty: 90 TABLET | Refills: 3 | Status: SHIPPED | OUTPATIENT
Start: 2023-10-04

## 2023-10-28 SDOH — ECONOMIC STABILITY: INCOME INSECURITY: HOW HARD IS IT FOR YOU TO PAY FOR THE VERY BASICS LIKE FOOD, HOUSING, MEDICAL CARE, AND HEATING?: NOT HARD AT ALL

## 2023-10-28 SDOH — ECONOMIC STABILITY: TRANSPORTATION INSECURITY
IN THE PAST 12 MONTHS, HAS LACK OF TRANSPORTATION KEPT YOU FROM MEETINGS, WORK, OR FROM GETTING THINGS NEEDED FOR DAILY LIVING?: NO

## 2023-10-28 SDOH — HEALTH STABILITY: PHYSICAL HEALTH: ON AVERAGE, HOW MANY MINUTES DO YOU ENGAGE IN EXERCISE AT THIS LEVEL?: 20 MIN

## 2023-10-28 SDOH — ECONOMIC STABILITY: FOOD INSECURITY: WITHIN THE PAST 12 MONTHS, THE FOOD YOU BOUGHT JUST DIDN'T LAST AND YOU DIDN'T HAVE MONEY TO GET MORE.: NEVER TRUE

## 2023-10-28 SDOH — ECONOMIC STABILITY: HOUSING INSECURITY
IN THE LAST 12 MONTHS, WAS THERE A TIME WHEN YOU DID NOT HAVE A STEADY PLACE TO SLEEP OR SLEPT IN A SHELTER (INCLUDING NOW)?: NO

## 2023-10-28 SDOH — HEALTH STABILITY: PHYSICAL HEALTH: ON AVERAGE, HOW MANY DAYS PER WEEK DO YOU ENGAGE IN MODERATE TO STRENUOUS EXERCISE (LIKE A BRISK WALK)?: 4 DAYS

## 2023-10-28 SDOH — ECONOMIC STABILITY: FOOD INSECURITY: WITHIN THE PAST 12 MONTHS, YOU WORRIED THAT YOUR FOOD WOULD RUN OUT BEFORE YOU GOT MONEY TO BUY MORE.: NEVER TRUE

## 2023-10-28 ASSESSMENT — LIFESTYLE VARIABLES
HOW MANY STANDARD DRINKS CONTAINING ALCOHOL DO YOU HAVE ON A TYPICAL DAY: 1 OR 2
HOW OFTEN DO YOU HAVE A DRINK CONTAINING ALCOHOL: MONTHLY OR LESS
HOW OFTEN DO YOU HAVE SIX OR MORE DRINKS ON ONE OCCASION: 1
HOW MANY STANDARD DRINKS CONTAINING ALCOHOL DO YOU HAVE ON A TYPICAL DAY: 1
HOW OFTEN DO YOU HAVE A DRINK CONTAINING ALCOHOL: 2

## 2023-10-28 ASSESSMENT — PATIENT HEALTH QUESTIONNAIRE - PHQ9
SUM OF ALL RESPONSES TO PHQ QUESTIONS 1-9: 0
2. FEELING DOWN, DEPRESSED OR HOPELESS: 0
SUM OF ALL RESPONSES TO PHQ9 QUESTIONS 1 & 2: 0
SUM OF ALL RESPONSES TO PHQ QUESTIONS 1-9: 0
1. LITTLE INTEREST OR PLEASURE IN DOING THINGS: 0
SUM OF ALL RESPONSES TO PHQ QUESTIONS 1-9: 0
SUM OF ALL RESPONSES TO PHQ QUESTIONS 1-9: 0

## 2023-10-31 ENCOUNTER — OFFICE VISIT (OUTPATIENT)
Dept: INTERNAL MEDICINE CLINIC | Age: 73
End: 2023-10-31
Payer: MEDICARE

## 2023-10-31 VITALS
HEART RATE: 60 BPM | SYSTOLIC BLOOD PRESSURE: 142 MMHG | BODY MASS INDEX: 28.26 KG/M2 | WEIGHT: 213.2 LBS | DIASTOLIC BLOOD PRESSURE: 82 MMHG | HEIGHT: 73 IN | OXYGEN SATURATION: 97 %

## 2023-10-31 DIAGNOSIS — I42.9 PRIMARY CARDIOMYOPATHY (HCC): Chronic | ICD-10-CM

## 2023-10-31 DIAGNOSIS — R97.20 ABNORMAL PSA: ICD-10-CM

## 2023-10-31 DIAGNOSIS — E78.49 OTHER HYPERLIPIDEMIA: ICD-10-CM

## 2023-10-31 DIAGNOSIS — I10 ESSENTIAL HYPERTENSION: ICD-10-CM

## 2023-10-31 DIAGNOSIS — Z00.00 MEDICARE ANNUAL WELLNESS VISIT, SUBSEQUENT: Primary | ICD-10-CM

## 2023-10-31 DIAGNOSIS — Z12.11 SCREEN FOR COLON CANCER: ICD-10-CM

## 2023-10-31 LAB
ALBUMIN SERPL-MCNC: 4.3 G/DL (ref 3.4–5)
ALBUMIN/GLOB SERPL: 1.8 {RATIO} (ref 1.1–2.2)
ALP SERPL-CCNC: 86 U/L (ref 40–129)
ALT SERPL-CCNC: 18 U/L (ref 10–40)
ANION GAP SERPL CALCULATED.3IONS-SCNC: 8 MMOL/L (ref 3–16)
AST SERPL-CCNC: 20 U/L (ref 15–37)
BILIRUB SERPL-MCNC: 0.5 MG/DL (ref 0–1)
BUN SERPL-MCNC: 16 MG/DL (ref 7–20)
CALCIUM SERPL-MCNC: 9 MG/DL (ref 8.3–10.6)
CHLORIDE SERPL-SCNC: 109 MMOL/L (ref 99–110)
CHOLEST SERPL-MCNC: 164 MG/DL (ref 0–199)
CO2 SERPL-SCNC: 30 MMOL/L (ref 21–32)
CREAT SERPL-MCNC: 1 MG/DL (ref 0.8–1.3)
GFR SERPLBLD CREATININE-BSD FMLA CKD-EPI: >60 ML/MIN/{1.73_M2}
GLUCOSE SERPL-MCNC: 91 MG/DL (ref 70–99)
HDLC SERPL-MCNC: 48 MG/DL (ref 40–60)
LDL CHOLESTEROL CALCULATED: 102 MG/DL
POTASSIUM SERPL-SCNC: 4.4 MMOL/L (ref 3.5–5.1)
PROT SERPL-MCNC: 6.7 G/DL (ref 6.4–8.2)
PSA SERPL DL<=0.01 NG/ML-MCNC: 4 NG/ML (ref 0–4)
SODIUM SERPL-SCNC: 147 MMOL/L (ref 136–145)
TRIGL SERPL-MCNC: 72 MG/DL (ref 0–150)
VLDLC SERPL CALC-MCNC: 14 MG/DL

## 2023-10-31 PROCEDURE — 3017F COLORECTAL CA SCREEN DOC REV: CPT | Performed by: NURSE PRACTITIONER

## 2023-10-31 PROCEDURE — 3079F DIAST BP 80-89 MM HG: CPT | Performed by: NURSE PRACTITIONER

## 2023-10-31 PROCEDURE — G8484 FLU IMMUNIZE NO ADMIN: HCPCS | Performed by: NURSE PRACTITIONER

## 2023-10-31 PROCEDURE — 3077F SYST BP >= 140 MM HG: CPT | Performed by: NURSE PRACTITIONER

## 2023-10-31 PROCEDURE — 1123F ACP DISCUSS/DSCN MKR DOCD: CPT | Performed by: NURSE PRACTITIONER

## 2023-10-31 PROCEDURE — G0439 PPPS, SUBSEQ VISIT: HCPCS | Performed by: NURSE PRACTITIONER

## 2023-10-31 ASSESSMENT — ENCOUNTER SYMPTOMS
SHORTNESS OF BREATH: 0
ABDOMINAL PAIN: 0
WHEEZING: 0
DIARRHEA: 0
NAUSEA: 0
COUGH: 0
VOMITING: 0
CONSTIPATION: 0

## 2023-11-01 DIAGNOSIS — Z12.11 SCREEN FOR COLON CANCER: ICD-10-CM

## 2023-11-01 DIAGNOSIS — E87.0 SERUM SODIUM ELEVATED: Primary | ICD-10-CM

## 2023-11-01 LAB
CONTROL: NORMAL
FECAL BLOOD IMMUNOCHEMICAL TEST: NORMAL

## 2023-11-01 PROCEDURE — 82274 ASSAY TEST FOR BLOOD FECAL: CPT | Performed by: NURSE PRACTITIONER

## 2023-12-23 PROCEDURE — 93296 REM INTERROG EVL PM/IDS: CPT | Performed by: INTERNAL MEDICINE

## 2023-12-23 PROCEDURE — 93295 DEV INTERROG REMOTE 1/2/MLT: CPT | Performed by: INTERNAL MEDICINE

## 2024-06-13 ENCOUNTER — TELEPHONE (OUTPATIENT)
Dept: INTERNAL MEDICINE CLINIC | Age: 74
End: 2024-06-13

## 2024-08-09 RX ORDER — LISINOPRIL 10 MG/1
10 TABLET ORAL DAILY
Qty: 90 TABLET | Refills: 0 | Status: SHIPPED | OUTPATIENT
Start: 2024-08-09

## 2024-08-09 NOTE — TELEPHONE ENCOUNTER
Last ov:23 DKW  Next ov:24 MXA  Last EK/10/23  Last labs:10/31/23  Last filled:   Disp Refills Start End    lisinopril (PRINIVIL;ZESTRIL) 10 MG tablet 90 tablet 3 2023 --    Sig - Route: Take 1 tablet by mouth daily - Oral    Sent to pharmacy as: Lisinopril 10 MG Oral Tablet (PRINIVIL;ZESTRIL)    Cosign for Ordering: Accepted by Jax Mccauley DO on 2023 12:58 PM    E-Prescribing Status: Receipt confirmed by pharmacy (2023  9:33 AM EDT)

## 2024-08-09 NOTE — PROGRESS NOTES
[facility]   Electrophysiology Follow up   Date: 2024  I had the privilege of visiting Freddy Trinidad in the office.     CC: Pacemaker  HPI: Freddy Trinidad is a 74 y.o. male  who has a history of AVR([facility], redo1/16)cardiomyopathy, (AICD second device--battery , was turned off, at the time  His EF was  45%), HTN, HLD. He is s/p PVC ablation and single chamber ICD Gen change 19.     Muga scan 02/15/2022 shows improved EF - 40%     Interval History:  Freddy presents today in office with no complaints. Device interrogation shows irregular beats but no afib. Patient denies lightheadedness, dizziness, chest pain, palpitations, orthopnea, edema, presyncope or syncope. States he is active and regularly golfs.    Assessment and plan:   Nonischemic cardiomyopathy              - 02/15/2022 Muga scan 40%               - LVEF 2021 25-30%              - Continue coreg and lisinopril               - S/p PVC ablation and single chamber ICD Gen change 19              - 6/3/2020 echo EF 25 % but upon further visualization (myself) the EF appears unchanged from 2019, 19 echo EF 30-35 %,  19  Echo  EF 30-35%              - 24 hour holter 2019 PVC burden 32.1%              - 16 echo EF 25%              - follows with Dr. Mccauley               - discussed BiV upgrade if he becomes symptomatic with heart failure.      I reviewed the episodes of atrial fibrillation and they all seem to be sinus with PAC or short atrial runs.  For now there is not any evidence of atrial fibrillation.    Single chamber ICD (Gen change 19)   -Interrogation today shows: 6 years remaining,  <0.1%,  <0.1% AF burden per device interrogation today, presenting VS @ 62 bpm PVCs noted   42  reported AF episodes noted last on 2024 longest states it was 24 minutes, I reviewed  those episodes and it seems to be sinus with PAC or atrial runs   -Optivol WNL    PVC's

## 2024-08-14 ENCOUNTER — OFFICE VISIT (OUTPATIENT)
Dept: CARDIOLOGY CLINIC | Age: 74
End: 2024-08-14
Payer: MEDICARE

## 2024-08-14 ENCOUNTER — NURSE ONLY (OUTPATIENT)
Dept: CARDIOLOGY CLINIC | Age: 74
End: 2024-08-14

## 2024-08-14 VITALS
SYSTOLIC BLOOD PRESSURE: 128 MMHG | DIASTOLIC BLOOD PRESSURE: 78 MMHG | OXYGEN SATURATION: 98 % | HEIGHT: 73 IN | HEART RATE: 53 BPM | WEIGHT: 214.4 LBS | BODY MASS INDEX: 28.41 KG/M2

## 2024-08-14 DIAGNOSIS — I42.9 PRIMARY CARDIOMYOPATHY (HCC): Primary | Chronic | ICD-10-CM

## 2024-08-14 DIAGNOSIS — Z95.810 ICD (IMPLANTABLE CARDIOVERTER-DEFIBRILLATOR) IN PLACE: Chronic | ICD-10-CM

## 2024-08-14 DIAGNOSIS — Z95.810 ICD (IMPLANTABLE CARDIOVERTER-DEFIBRILLATOR) IN PLACE: Primary | Chronic | ICD-10-CM

## 2024-08-14 DIAGNOSIS — I35.0 NONRHEUMATIC AORTIC VALVE STENOSIS: ICD-10-CM

## 2024-08-14 DIAGNOSIS — I10 ESSENTIAL HYPERTENSION: ICD-10-CM

## 2024-08-14 DIAGNOSIS — I49.3 PVC (PREMATURE VENTRICULAR CONTRACTION): ICD-10-CM

## 2024-08-14 DIAGNOSIS — I42.8 NONISCHEMIC CARDIOMYOPATHY (HCC): ICD-10-CM

## 2024-08-14 PROCEDURE — 3074F SYST BP LT 130 MM HG: CPT | Performed by: INTERNAL MEDICINE

## 2024-08-14 PROCEDURE — 1036F TOBACCO NON-USER: CPT | Performed by: INTERNAL MEDICINE

## 2024-08-14 PROCEDURE — G8419 CALC BMI OUT NRM PARAM NOF/U: HCPCS | Performed by: INTERNAL MEDICINE

## 2024-08-14 PROCEDURE — 93000 ELECTROCARDIOGRAM COMPLETE: CPT | Performed by: INTERNAL MEDICINE

## 2024-08-14 PROCEDURE — 99214 OFFICE O/P EST MOD 30 MIN: CPT | Performed by: INTERNAL MEDICINE

## 2024-08-14 PROCEDURE — 1123F ACP DISCUSS/DSCN MKR DOCD: CPT | Performed by: INTERNAL MEDICINE

## 2024-08-14 PROCEDURE — 3078F DIAST BP <80 MM HG: CPT | Performed by: INTERNAL MEDICINE

## 2024-08-14 PROCEDURE — G8427 DOCREV CUR MEDS BY ELIG CLIN: HCPCS | Performed by: INTERNAL MEDICINE

## 2024-08-19 PROBLEM — Z95.2 HISTORY OF AORTIC VALVE REPLACEMENT: Status: ACTIVE | Noted: 2024-08-19

## 2024-09-12 ENCOUNTER — OFFICE VISIT (OUTPATIENT)
Dept: CARDIOLOGY CLINIC | Age: 74
End: 2024-09-12
Payer: MEDICARE

## 2024-09-12 VITALS
DIASTOLIC BLOOD PRESSURE: 72 MMHG | OXYGEN SATURATION: 97 % | BODY MASS INDEX: 28.36 KG/M2 | HEART RATE: 67 BPM | SYSTOLIC BLOOD PRESSURE: 130 MMHG | WEIGHT: 214 LBS | HEIGHT: 73 IN

## 2024-09-12 DIAGNOSIS — E78.2 MIXED HYPERLIPIDEMIA: ICD-10-CM

## 2024-09-12 DIAGNOSIS — I10 ESSENTIAL HYPERTENSION: ICD-10-CM

## 2024-09-12 DIAGNOSIS — I49.3 PVC (PREMATURE VENTRICULAR CONTRACTION): ICD-10-CM

## 2024-09-12 DIAGNOSIS — I42.8 NONISCHEMIC CARDIOMYOPATHY (HCC): ICD-10-CM

## 2024-09-12 DIAGNOSIS — Z95.810 ICD (IMPLANTABLE CARDIOVERTER-DEFIBRILLATOR) IN PLACE: Chronic | ICD-10-CM

## 2024-09-12 DIAGNOSIS — E78.5 HYPERLIPIDEMIA, UNSPECIFIED HYPERLIPIDEMIA TYPE: ICD-10-CM

## 2024-09-12 DIAGNOSIS — Z95.2 HISTORY OF AORTIC VALVE REPLACEMENT: Primary | ICD-10-CM

## 2024-09-12 PROCEDURE — G8427 DOCREV CUR MEDS BY ELIG CLIN: HCPCS | Performed by: INTERNAL MEDICINE

## 2024-09-12 PROCEDURE — 99214 OFFICE O/P EST MOD 30 MIN: CPT | Performed by: INTERNAL MEDICINE

## 2024-09-12 PROCEDURE — 1123F ACP DISCUSS/DSCN MKR DOCD: CPT | Performed by: INTERNAL MEDICINE

## 2024-09-12 PROCEDURE — 3075F SYST BP GE 130 - 139MM HG: CPT | Performed by: INTERNAL MEDICINE

## 2024-09-12 PROCEDURE — 1036F TOBACCO NON-USER: CPT | Performed by: INTERNAL MEDICINE

## 2024-09-12 PROCEDURE — 3078F DIAST BP <80 MM HG: CPT | Performed by: INTERNAL MEDICINE

## 2024-09-12 PROCEDURE — G8419 CALC BMI OUT NRM PARAM NOF/U: HCPCS | Performed by: INTERNAL MEDICINE

## 2024-09-12 RX ORDER — CARVEDILOL 6.25 MG/1
6.25 TABLET ORAL 2 TIMES DAILY WITH MEALS
Qty: 180 TABLET | Refills: 0 | OUTPATIENT
Start: 2024-09-12

## 2024-09-12 RX ORDER — CARVEDILOL 12.5 MG/1
12.5 TABLET ORAL 2 TIMES DAILY WITH MEALS
Qty: 90 TABLET | Refills: 3 | Status: SHIPPED | OUTPATIENT
Start: 2024-09-12

## 2024-09-16 DIAGNOSIS — E78.2 MIXED HYPERLIPIDEMIA: ICD-10-CM

## 2024-09-16 DIAGNOSIS — Z95.2 HISTORY OF AORTIC VALVE REPLACEMENT: ICD-10-CM

## 2024-09-16 DIAGNOSIS — I42.8 NONISCHEMIC CARDIOMYOPATHY (HCC): ICD-10-CM

## 2024-09-16 DIAGNOSIS — I10 ESSENTIAL HYPERTENSION: ICD-10-CM

## 2024-09-17 LAB
ALBUMIN SERPL-MCNC: 4.2 G/DL (ref 3.4–5)
ANION GAP SERPL CALCULATED.3IONS-SCNC: 7 MMOL/L (ref 3–16)
BUN SERPL-MCNC: 18 MG/DL (ref 7–20)
CALCIUM SERPL-MCNC: 9 MG/DL (ref 8.3–10.6)
CHLORIDE SERPL-SCNC: 107 MMOL/L (ref 99–110)
CHOLEST SERPL-MCNC: 160 MG/DL (ref 0–199)
CO2 SERPL-SCNC: 28 MMOL/L (ref 21–32)
CREAT SERPL-MCNC: 1.1 MG/DL (ref 0.8–1.3)
DEPRECATED RDW RBC AUTO: 13.5 % (ref 12.4–15.4)
GFR SERPLBLD CREATININE-BSD FMLA CKD-EPI: 69 ML/MIN/{1.73_M2}
GLUCOSE SERPL-MCNC: 87 MG/DL (ref 70–99)
HCT VFR BLD AUTO: 40.5 % (ref 40.5–52.5)
HDLC SERPL-MCNC: 44 MG/DL (ref 40–60)
HGB BLD-MCNC: 13.2 G/DL (ref 13.5–17.5)
LDLC SERPL CALC-MCNC: 103 MG/DL
MAGNESIUM SERPL-MCNC: 2.11 MG/DL (ref 1.8–2.4)
MCH RBC QN AUTO: 32.5 PG (ref 26–34)
MCHC RBC AUTO-ENTMCNC: 32.7 G/DL (ref 31–36)
MCV RBC AUTO: 99.4 FL (ref 80–100)
NT-PROBNP SERPL-MCNC: 827 PG/ML (ref 0–449)
PHOSPHATE SERPL-MCNC: 2.5 MG/DL (ref 2.5–4.9)
PLATELET # BLD AUTO: 181 K/UL (ref 135–450)
PMV BLD AUTO: 8.9 FL (ref 5–10.5)
POTASSIUM SERPL-SCNC: 4.5 MMOL/L (ref 3.5–5.1)
RBC # BLD AUTO: 4.07 M/UL (ref 4.2–5.9)
SODIUM SERPL-SCNC: 142 MMOL/L (ref 136–145)
TRIGL SERPL-MCNC: 67 MG/DL (ref 0–150)
VLDLC SERPL CALC-MCNC: 13 MG/DL
WBC # BLD AUTO: 6.8 K/UL (ref 4–11)

## 2024-09-24 ENCOUNTER — HOSPITAL ENCOUNTER (OUTPATIENT)
Dept: CT IMAGING | Age: 74
Discharge: HOME | End: 2024-09-24
Attending: INTERNAL MEDICINE
Payer: MEDICARE

## 2024-09-24 DIAGNOSIS — Z95.2 HISTORY OF AORTIC VALVE REPLACEMENT: ICD-10-CM

## 2024-09-24 DIAGNOSIS — I42.8 NONISCHEMIC CARDIOMYOPATHY (HCC): ICD-10-CM

## 2024-09-24 LAB
CREAT SERPL-MCNC: 0.8 MG/DL (ref 0.8–1.3)
GFR SERPLBLD CREATININE-BSD FMLA CKD-EPI: >90 ML/MIN/{1.73_M2}

## 2024-09-24 PROCEDURE — 6360000004 HC RX CONTRAST MEDICATION: Performed by: INTERNAL MEDICINE

## 2024-09-24 PROCEDURE — 71275 CT ANGIOGRAPHY CHEST: CPT

## 2024-09-24 PROCEDURE — 82565 ASSAY OF CREATININE: CPT

## 2024-09-24 PROCEDURE — 36415 COLL VENOUS BLD VENIPUNCTURE: CPT

## 2024-09-24 RX ORDER — IOPAMIDOL 755 MG/ML
75 INJECTION, SOLUTION INTRAVASCULAR
Status: COMPLETED | OUTPATIENT
Start: 2024-09-24 | End: 2024-09-24

## 2024-09-24 RX ADMIN — IOPAMIDOL 75 ML: 755 INJECTION, SOLUTION INTRAVENOUS at 16:45

## 2024-09-27 DIAGNOSIS — Z95.2 HISTORY OF AORTIC VALVE REPLACEMENT: ICD-10-CM

## 2024-09-27 DIAGNOSIS — I35.9 AORTIC VALVE DISORDER: ICD-10-CM

## 2024-09-27 DIAGNOSIS — I42.8 NONISCHEMIC CARDIOMYOPATHY (HCC): Primary | ICD-10-CM

## 2024-09-30 ENCOUNTER — TELEPHONE (OUTPATIENT)
Dept: CARDIOLOGY CLINIC | Age: 74
End: 2024-09-30

## 2024-09-30 NOTE — TELEPHONE ENCOUNTER
Date of Procedure: Wednesday 10/9/24 @ [facility] with Dr. Mccauley     Time of arrival: 8:00 am     Procedure time: 9:00 am     Called and spoke to Freddy and he is agreeable to date and time. Reviewed instructions and he verbalized understanding. Encouraged to call with any questions or concerns.     Published on [employer], scheduled in Epic and e-mailed to cath lab.

## 2024-09-30 NOTE — TELEPHONE ENCOUNTER
----- Message from DONALDO MARCIAL RN sent at 9/27/2024  2:07 PM EDT -----  Please schedule patient for HAJA with james Mccauley

## 2024-10-04 NOTE — TELEPHONE ENCOUNTER
Received refill request for  amoxicillin (AMOXIL) 500 MG capsule  from [facility] pharmacy.     Last OV: 9/12/24    Next OV: 12/12/24    Last Labs:     Last Filled: 9/20/23

## 2024-10-09 ENCOUNTER — HOSPITAL ENCOUNTER (OUTPATIENT)
Age: 74
Discharge: HOME | End: 2024-10-11
Attending: INTERNAL MEDICINE
Payer: MEDICARE

## 2024-10-09 VITALS
HEART RATE: 69 BPM | RESPIRATION RATE: 17 BRPM | BODY MASS INDEX: 28.36 KG/M2 | TEMPERATURE: 97.9 F | WEIGHT: 214 LBS | HEIGHT: 73 IN | OXYGEN SATURATION: 92 % | SYSTOLIC BLOOD PRESSURE: 119 MMHG | DIASTOLIC BLOOD PRESSURE: 72 MMHG

## 2024-10-09 DIAGNOSIS — I42.8 NONISCHEMIC CARDIOMYOPATHY (HCC): ICD-10-CM

## 2024-10-09 DIAGNOSIS — I35.9 AORTIC VALVE DISORDER: ICD-10-CM

## 2024-10-09 DIAGNOSIS — Z95.2 HISTORY OF AORTIC VALVE REPLACEMENT: ICD-10-CM

## 2024-10-09 LAB — ECHO BSA: 2.24 M2

## 2024-10-09 PROCEDURE — 6370000000 HC RX 637 (ALT 250 FOR IP): Performed by: INTERNAL MEDICINE

## 2024-10-09 PROCEDURE — 99152 MOD SED SAME PHYS/QHP 5/>YRS: CPT | Performed by: INTERNAL MEDICINE

## 2024-10-09 PROCEDURE — 7100000011 HC PHASE II RECOVERY - ADDTL 15 MIN: Performed by: INTERNAL MEDICINE

## 2024-10-09 PROCEDURE — 6360000002 HC RX W HCPCS: Performed by: INTERNAL MEDICINE

## 2024-10-09 PROCEDURE — 93313 ECHO TRANSESOPHAGEAL: CPT

## 2024-10-09 PROCEDURE — 99153 MOD SED SAME PHYS/QHP EA: CPT | Performed by: INTERNAL MEDICINE

## 2024-10-09 PROCEDURE — 7100000010 HC PHASE II RECOVERY - FIRST 15 MIN: Performed by: INTERNAL MEDICINE

## 2024-10-09 RX ORDER — LIDOCAINE HYDROCHLORIDE 20 MG/ML
SOLUTION OROPHARYNGEAL PRN
Status: COMPLETED | OUTPATIENT
Start: 2024-10-09 | End: 2024-10-09

## 2024-10-09 RX ORDER — FENTANYL CITRATE 50 UG/ML
INJECTION, SOLUTION INTRAMUSCULAR; INTRAVENOUS PRN
Status: COMPLETED | OUTPATIENT
Start: 2024-10-09 | End: 2024-10-09

## 2024-10-09 RX ORDER — MIDAZOLAM HYDROCHLORIDE 1 MG/ML
INJECTION INTRAMUSCULAR; INTRAVENOUS PRN
Status: COMPLETED | OUTPATIENT
Start: 2024-10-09 | End: 2024-10-09

## 2024-10-09 RX ADMIN — FENTANYL CITRATE 25 MCG: 50 INJECTION, SOLUTION INTRAMUSCULAR; INTRAVENOUS at 09:22

## 2024-10-09 RX ADMIN — FENTANYL CITRATE 25 MCG: 50 INJECTION, SOLUTION INTRAMUSCULAR; INTRAVENOUS at 09:13

## 2024-10-09 RX ADMIN — LIDOCAINE HYDROCHLORIDE 15 ML: 20 SOLUTION ORAL; TOPICAL at 08:57

## 2024-10-09 RX ADMIN — MIDAZOLAM 1 MG: 1 INJECTION INTRAMUSCULAR; INTRAVENOUS at 09:13

## 2024-10-09 RX ADMIN — FENTANYL CITRATE 25 MCG: 50 INJECTION, SOLUTION INTRAMUSCULAR; INTRAVENOUS at 09:09

## 2024-10-09 RX ADMIN — MIDAZOLAM 1 MG: 1 INJECTION INTRAMUSCULAR; INTRAVENOUS at 09:17

## 2024-10-09 RX ADMIN — MIDAZOLAM 1 MG: 1 INJECTION INTRAMUSCULAR; INTRAVENOUS at 09:06

## 2024-10-09 RX ADMIN — FENTANYL CITRATE 25 MCG: 50 INJECTION, SOLUTION INTRAMUSCULAR; INTRAVENOUS at 09:18

## 2024-10-09 RX ADMIN — FENTANYL CITRATE 25 MCG: 50 INJECTION, SOLUTION INTRAMUSCULAR; INTRAVENOUS at 09:06

## 2024-10-09 RX ADMIN — MIDAZOLAM 1 MG: 1 INJECTION INTRAMUSCULAR; INTRAVENOUS at 09:09

## 2024-10-09 RX ADMIN — MIDAZOLAM 1 MG: 1 INJECTION INTRAMUSCULAR; INTRAVENOUS at 09:22

## 2024-10-09 NOTE — PROGRESS NOTES
Pt sitting up on side of bed, denies feeling dizzy or lightheaded.  Dr. Mccauley talking with pt and son.  Pt discharged home per wheelchair with son driving.

## 2024-10-09 NOTE — DISCHARGE INSTRUCTIONS
HAJA DISCHARGE INSTRUCTIONS    No driving for 24 hours. We strongly recommend that a responsible adult stay with you for the next 24 hours.    Continue Medications.    Advance diet as tolerated    Contact physician office  for following symptoms:  Fever  Difficulty swallowing  Chest pain  Difficulty breathing  Bleeding

## 2024-10-09 NOTE — SEDATION DOCUMENTATION
Brief Pre-Op Note/Sedation Assessment      Freddy Trinidad  1950  [id number]  8:47 AM    Planned Procedure: Cardiac Catheterization Procedure  Post Procedure Plan: Return to same level of care  Consent: I have discussed with the patient and/or the patient representative the indication, alternatives, and the possible risks and/or complications of the planned procedure and the anesthesia methods. The patient and/or patient representative appear to understand and agree to proceed.        Chief Complaint:   Valvular dysfunction, heart failure       Pre-Procedure Medical History:  Vital Signs:  BP (!) 165/85   Pulse 57   Temp 97.9 °F (36.6 °C)   Resp 16   Ht 1.854 m (6' 1\")   Wt 97.1 kg (214 lb)   SpO2 100%   BMI 28.23 kg/m²     Allergies:  No Known Allergies  Medications:    Current Outpatient Medications   Medication Sig Dispense Refill    empagliflozin (JARDIANCE) 10 MG tablet Take 1 tablet by mouth daily 30 tablet 5    carvedilol (COREG) 12.5 MG tablet Take 1 tablet by mouth 2 times daily (with meals) 90 tablet 3    lisinopril (PRINIVIL;ZESTRIL) 10 MG tablet Take 1 tablet by mouth once daily 90 tablet 0    vitamin C (ASCORBIC ACID) 500 MG tablet Take 1 tablet by mouth daily      Cholecalciferol (VITAMIN D PO) Take 1,000 Units by mouth daily       aspirin 81 MG tablet Take 1 tablet by mouth daily      amoxicillin (AMOXIL) 500 MG capsule 2000 mg one hour prior procedure ( 4 tabs) (Patient not taking: Reported on 9/12/2024) 4 capsule 3     No current facility-administered medications for this encounter.       Past Medical History:    Past Medical History:   Diagnosis Date    Abnormal PSA     Elevated LDL cholesterol level     Hypertension     Non-ischemic cardiomyopathy (HCC)     Severe aortic stenosis     Vitamin D deficiency        Surgical History:    Past Surgical History:   Procedure Laterality Date    AORTIC VALVE REPLACEMENT  01/06/2016    Dr. Villasenor - 27 mm Mosaic Ultra porcine valve #[id number]

## 2024-10-09 NOTE — H&P
Department of Cardiology   Pre-operative History and Physical        DIAGNOSIS:  prosthetic valve dysfunction, heart failure     INDICATION:  prosthetic valve dysfunction, heart failure     PROCEDURE:  transesophageal echo    CHIEF COMPLAINT:  prosthetic valve dysfunction     History Obtained From:  patient    HISTORY OF PRESENT ILLNESS:      The patient is a 74 y.o. male with significant past medical history of aortic valve replacement who presents with worsening valve dysfunction     Past Medical History:        Diagnosis Date    Abnormal PSA     Elevated LDL cholesterol level     Hypertension     Non-ischemic cardiomyopathy (HCC)     Severe aortic stenosis     Vitamin D deficiency      Past Surgical History:        Procedure Laterality Date    AORTIC VALVE REPLACEMENT  01/06/2016    Dr. Villasenor - 27 mm Mosaic Ultra porcine valve #[id number]    AORTIC VALVULOPLASTY  1996    AV repair @ [facility]    APPENDECTOMY      CARDIAC DEFIBRILLATOR PLACEMENT  1996/ 2005    for CM/ now turned off does not need    CATARACT REMOVAL WITH IMPLANT Left     CORONARY ARTERY BYPASS GRAFT      HERNIA REPAIR Right 2010     Medications Prior to Admission:   Not in a hospital admission.  Allergies:  Patient has no known allergies.    Social History:    TOBACCO:   reports that he has never smoked. He has never used smokeless tobacco.  Family History:        Problem Relation Age of Onset    Cancer Mother         skin    Lung Cancer Father         passed at 61 y/o    Heart Disease Neg Hx     High Blood Pressure Neg Hx     High Cholesterol Neg Hx     Heart Attack Neg Hx     Stroke Neg Hx     Prostate Cancer Neg Hx        Eyes:  Lids and lashes normal, pupils equal, round and reactive to light, extra ocular muscles intact, sclera clear, conjunctiva normal    Head/ENT:  Normocephalic, without obvious abnormality, atraumatic, sinuses nontender on palpation, external ears without lesions, oral pharynx with moist mucus membranes, tonsils

## 2024-10-10 RX ORDER — AMOXICILLIN 500 MG/1
CAPSULE ORAL
Qty: 4 CAPSULE | Refills: 3 | Status: SHIPPED | OUTPATIENT
Start: 2024-10-10

## 2024-10-14 LAB — ECHO BSA: 2.24 M2

## 2024-10-24 ENCOUNTER — TELEPHONE (OUTPATIENT)
Dept: CARDIOLOGY CLINIC | Age: 74
End: 2024-10-24

## 2024-10-24 DIAGNOSIS — Z95.2 HISTORY OF AORTIC VALVE REPLACEMENT: ICD-10-CM

## 2024-10-24 DIAGNOSIS — I35.1 NONRHEUMATIC AORTIC VALVE INSUFFICIENCY: Primary | ICD-10-CM

## 2024-10-24 DIAGNOSIS — I35.9 AORTIC VALVE DISORDER: ICD-10-CM

## 2024-10-25 ENCOUNTER — TELEPHONE (OUTPATIENT)
Dept: CARDIOLOGY CLINIC | Age: 74
End: 2024-10-25

## 2024-10-25 NOTE — TELEPHONE ENCOUNTER
Date of Procedure: Monday 12/16/24 @ [facility] with Dr. Mccauley    Time of arrival: 12:30 pm     Procedure time: 1:30 pm     Called and spoke to Freddy and he is agreeable to date and time. Reviewed instructions and he verbalized understanding. Encouraged to call with any questions or concerns.     Published on [facility], scheduled in Epic and e-mailed to cath lab.

## 2024-10-25 NOTE — TELEPHONE ENCOUNTER
----- Message from DONALDO MARCIAL RN sent at 10/24/2024  4:04 PM EDT -----  Please set him up with kevin for a chantal with anesthesia, thank you

## 2024-11-06 RX ORDER — LISINOPRIL 10 MG/1
10 TABLET ORAL DAILY
Qty: 90 TABLET | Refills: 3 | Status: SHIPPED | OUTPATIENT
Start: 2024-11-06

## 2024-11-06 NOTE — TELEPHONE ENCOUNTER
Last ov:24 DKW  Next ov:24 DKW  Last EK24  Last labs:24  Last filled:   Disp Refills Start End    lisinopril (PRINIVIL;ZESTRIL) 10 MG tablet 90 tablet 0 2024 --    Sig - Route: Take 1 tablet by mouth once daily - Oral    Sent to pharmacy as: Lisinopril 10 MG Oral Tablet (PRINIVIL;ZESTRIL)    E-Prescribing Status: Receipt confirmed by pharmacy (2024 10:04 AM EDT)

## 2024-12-11 ASSESSMENT — ENCOUNTER SYMPTOMS
CHEST TIGHTNESS: 0
COUGH: 0
SHORTNESS OF BREATH: 0
ABDOMINAL DISTENTION: 0
PHOTOPHOBIA: 0
NAUSEA: 0
BLOOD IN STOOL: 0
ABDOMINAL PAIN: 0

## 2024-12-11 NOTE — PROGRESS NOTES
Cholelithiasis  Holter: 2019  PVC burden 32.1 %  EK22-Sinus  Rhythm  -First degree A-V block  - frequent multiform ectopic ventricular beats Davida = 232   # VECs = 3, # types 2 -Right bundle branch block with left axis -bifascicular block.   -  Nonspecific T-abnormality.  8/10/40-nex-Kdkyxdgi atrial fibrillation  - occasional ectopic ventricular beat     -Right bundle branch block with left axis -bifascicular block.    -Old anteroseptal infarct.    -  Nonspecific T-abnormality.  abd ultrasound-- No AAA  Carotid dopplers- no significant disease  moderate Risk  moderate Complexity/Medical Decision Making  Outside records Reviewed  Labs Reviewed   Prior Imaging, ekg, cath, echo reviewed when available  Medications reviewed  Old Notes reviewed  Management options discussed    ASSESSMENT AND PLAN     1.   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 No symptoms  -Developed hyperkalemia with entresto. not on spironolactone due to history of hyperkalemia  -echo 21, EF 25-30%, 2022  muga EF 40%  - 23 > Pro-, BUN 16, Creat 1.0  - Device check reviewed by Dr. Powell on 
class I, ACC C. No symptoms  -previously could not afford entresto  -echo 9/13/21, EF 25-30%, 2/2022  muga EF 40%  9/25/24 echo ef 40-45%- jardiance added  - Device check reviewed by Dr. Powell on 8/14/24 - episodes of Afib seem to be sinus with PAC or short atrial runs - for now there is not any evidence of atrial fibrillation  Plan  - 8/14/24 - Dr. Powell discussed with her BiV upgrade if he becomes symptomatic with heart failure  -continue lisinopril and coreg   - jardiance      4.Frequent PVCs/PVC ablation  -3/2019 ablation ( Dr Powell)  -PVC burden decreased to 55/hour  -no palpitations dizziness or syncope  -stable  Plan   -Carvedilol to 12.5 mg BID   -Will call for changes      5.   Single chamber ICD  -generator change 8/13/19  -no shocks  -history of vf  Plan  -continue above  -instructed to avoid qtc prolonging medications, abx and supplements. Call with any med questions      6.Essential Hypertension  -BP today - 142/68  - BP goal 120's systolic  - not to goal  Plan  -continue lisinopril   -Carvedilol to 12.5 mg BID  - will check blood pressures at home three times a week and call us in 3 weeks.      7.Mixed Hyperlipidemia  -10/21/23 > , TG 72, HDL 48, , ALT 18, AST 20  -9/16/24    tg 67 hdl 44 ldl 103 alt 18 ast 20  Plan  -prefers to control lipids with diet       Patient counseled on lifestyle modification, diet, and exercise.    Follow Up: one year      Dr. Medardo Mccauley DO  Cardiologist [facility]    Scribe's attestation: This note was scribed in the presence of Dr. Parisa DO by Mary Beth Shelton RN  , RN

## 2024-12-12 ENCOUNTER — OFFICE VISIT (OUTPATIENT)
Dept: CARDIOLOGY CLINIC | Age: 74
End: 2024-12-12

## 2024-12-12 VITALS
HEART RATE: 63 BPM | WEIGHT: 218 LBS | SYSTOLIC BLOOD PRESSURE: 142 MMHG | BODY MASS INDEX: 28.89 KG/M2 | DIASTOLIC BLOOD PRESSURE: 68 MMHG | HEIGHT: 73 IN | OXYGEN SATURATION: 96 %

## 2024-12-12 DIAGNOSIS — E78.2 MIXED HYPERLIPIDEMIA: ICD-10-CM

## 2024-12-12 DIAGNOSIS — I49.3 PVC (PREMATURE VENTRICULAR CONTRACTION): ICD-10-CM

## 2024-12-12 DIAGNOSIS — I10 ESSENTIAL HYPERTENSION: ICD-10-CM

## 2024-12-12 DIAGNOSIS — Z95.810 ICD (IMPLANTABLE CARDIOVERTER-DEFIBRILLATOR) IN PLACE: ICD-10-CM

## 2024-12-12 DIAGNOSIS — I35.9 AORTIC VALVE DISORDER: ICD-10-CM

## 2024-12-12 DIAGNOSIS — Z95.2 HISTORY OF AORTIC VALVE REPLACEMENT: Primary | ICD-10-CM

## 2024-12-12 DIAGNOSIS — I42.8 NONISCHEMIC CARDIOMYOPATHY (HCC): ICD-10-CM

## 2024-12-12 RX ORDER — LISINOPRIL 10 MG/1
10 TABLET ORAL DAILY
Qty: 90 TABLET | Refills: 3 | Status: SHIPPED | OUTPATIENT
Start: 2024-12-12

## 2024-12-12 RX ORDER — CARVEDILOL 12.5 MG/1
12.5 TABLET ORAL 2 TIMES DAILY WITH MEALS
Qty: 90 TABLET | Refills: 3 | Status: SHIPPED | OUTPATIENT
Start: 2024-12-12

## 2024-12-12 NOTE — PATIENT INSTRUCTIONS
Check blood pressure three times a week, goal less than 120/80  Avoid squatting, especially do not squat and lift weights        How to take a blood pressure by the American [employer] Association   - don't smoke drink caffeinated beverages or exercise within 30 minutes before measuring your blood pressure. Empty your bladder and ensure at least 5 minutes of quiet rest before measurement.   - sit with you back straight and supported ( on a dining chair, rather than a sofa) your feet should be flat on the floor and your legs should not be crossed. Our arm should be supported on a flat surface (such as a table) with the upper arm at heart level. Make sure the bottom of the cuff is placed directly above the bend of the elbow.   - don't take the measurements over clothes.    Date             Blood Pressure          Comment                                                 Date             Blood Pressure          Comment      ----------          -------------------          ---------------------------------                         ----------          -------------------          ---------------------------------        ----------          -------------------          ---------------------------------                         ----------          -------------------          ---------------------------------        ----------          -------------------          ---------------------------------                         ----------          -------------------          ---------------------------------        ----------          -------------------          ---------------------------------                        ----------          -------------------          ---------------------------------        ----------          -------------------          ---------------------------------                        ----------          -------------------          ---------------------------------      Call or send via My Chart above readings

## 2024-12-16 ENCOUNTER — ANESTHESIA (OUTPATIENT)
Age: 74
End: 2024-12-16
Payer: MEDICARE

## 2024-12-16 ENCOUNTER — ANESTHESIA EVENT (OUTPATIENT)
Age: 74
End: 2024-12-16
Payer: MEDICARE

## 2024-12-16 ENCOUNTER — HOSPITAL ENCOUNTER (OUTPATIENT)
Age: 74
Discharge: HOME | End: 2024-12-18
Attending: INTERNAL MEDICINE
Payer: MEDICARE

## 2024-12-16 VITALS
RESPIRATION RATE: 15 BRPM | TEMPERATURE: 98 F | OXYGEN SATURATION: 96 % | SYSTOLIC BLOOD PRESSURE: 128 MMHG | HEIGHT: 73 IN | WEIGHT: 200 LBS | DIASTOLIC BLOOD PRESSURE: 67 MMHG | BODY MASS INDEX: 26.51 KG/M2 | HEART RATE: 59 BPM

## 2024-12-16 DIAGNOSIS — I35.1 NONRHEUMATIC AORTIC VALVE INSUFFICIENCY: ICD-10-CM

## 2024-12-16 DIAGNOSIS — Z95.2 HISTORY OF AORTIC VALVE REPLACEMENT: ICD-10-CM

## 2024-12-16 PROCEDURE — 3700000001 HC ADD 15 MINUTES (ANESTHESIA): Performed by: INTERNAL MEDICINE

## 2024-12-16 PROCEDURE — 7100000010 HC PHASE II RECOVERY - FIRST 15 MIN: Performed by: INTERNAL MEDICINE

## 2024-12-16 PROCEDURE — 99152 MOD SED SAME PHYS/QHP 5/>YRS: CPT | Performed by: INTERNAL MEDICINE

## 2024-12-16 PROCEDURE — 3700000000 HC ANESTHESIA ATTENDED CARE: Performed by: INTERNAL MEDICINE

## 2024-12-16 PROCEDURE — 99153 MOD SED SAME PHYS/QHP EA: CPT | Performed by: INTERNAL MEDICINE

## 2024-12-16 PROCEDURE — 93320 DOPPLER ECHO COMPLETE: CPT

## 2024-12-16 PROCEDURE — 6360000002 HC RX W HCPCS: Performed by: NURSE ANESTHETIST, CERTIFIED REGISTERED

## 2024-12-16 PROCEDURE — 7100000011 HC PHASE II RECOVERY - ADDTL 15 MIN: Performed by: INTERNAL MEDICINE

## 2024-12-16 RX ORDER — PROPOFOL 10 MG/ML
INJECTION, EMULSION INTRAVENOUS
Status: DISCONTINUED | OUTPATIENT
Start: 2024-12-16 | End: 2024-12-16 | Stop reason: SDUPTHER

## 2024-12-16 RX ORDER — SODIUM CHLORIDE 0.9 % (FLUSH) 0.9 %
5-40 SYRINGE (ML) INJECTION EVERY 12 HOURS SCHEDULED
Status: DISCONTINUED | OUTPATIENT
Start: 2024-12-16 | End: 2024-12-19 | Stop reason: HOSPADM

## 2024-12-16 RX ORDER — SODIUM CHLORIDE 0.9 % (FLUSH) 0.9 %
5-40 SYRINGE (ML) INJECTION PRN
Status: DISCONTINUED | OUTPATIENT
Start: 2024-12-16 | End: 2024-12-19 | Stop reason: HOSPADM

## 2024-12-16 RX ORDER — SODIUM CHLORIDE 9 MG/ML
INJECTION, SOLUTION INTRAVENOUS PRN
Status: DISCONTINUED | OUTPATIENT
Start: 2024-12-16 | End: 2024-12-19 | Stop reason: HOSPADM

## 2024-12-16 RX ADMIN — PROPOFOL 20 MG: 10 INJECTION, EMULSION INTRAVENOUS at 13:30

## 2024-12-16 RX ADMIN — PROPOFOL 20 MG: 10 INJECTION, EMULSION INTRAVENOUS at 13:36

## 2024-12-16 RX ADMIN — PROPOFOL 20 MG: 10 INJECTION, EMULSION INTRAVENOUS at 13:33

## 2024-12-16 RX ADMIN — PROPOFOL 20 MG: 10 INJECTION, EMULSION INTRAVENOUS at 13:39

## 2024-12-16 RX ADMIN — PROPOFOL 20 MG: 10 INJECTION, EMULSION INTRAVENOUS at 13:21

## 2024-12-16 RX ADMIN — PROPOFOL 20 MG: 10 INJECTION, EMULSION INTRAVENOUS at 13:24

## 2024-12-16 RX ADMIN — PROPOFOL 50 MG: 10 INJECTION, EMULSION INTRAVENOUS at 13:19

## 2024-12-16 RX ADMIN — PROPOFOL 20 MG: 10 INJECTION, EMULSION INTRAVENOUS at 13:27

## 2024-12-16 NOTE — ANESTHESIA PRE PROCEDURE
Weight: 90.7 kg (200 lb)    Height: 1.854 m (6' 1\")                                               BP Readings from Last 3 Encounters:   12/16/24 (!) 176/99   12/12/24 (!) 142/68   10/09/24 119/72       NPO Status:                                                                                 BMI:   Wt Readings from Last 3 Encounters:   12/16/24 90.7 kg (200 lb)   12/12/24 98.9 kg (218 lb)   10/09/24 97.1 kg (214 lb)     Body mass index is 26.39 kg/m².    CBC:   Lab Results   Component Value Date/Time    WBC 6.8 09/16/2024 08:20 AM    RBC 4.07 09/16/2024 08:20 AM    HGB 13.2 09/16/2024 08:20 AM    HCT 40.5 09/16/2024 08:20 AM    MCV 99.4 09/16/2024 08:20 AM    RDW 13.5 09/16/2024 08:20 AM     09/16/2024 08:20 AM       CMP:   Lab Results   Component Value Date/Time     09/16/2024 08:20 AM    K 4.5 09/16/2024 08:20 AM     09/16/2024 08:20 AM    CO2 28 09/16/2024 08:20 AM    BUN 18 09/16/2024 08:20 AM    CREATININE 0.8 09/24/2024 04:39 PM    GFRAA >60 10/27/2021 08:47 AM    GFRAA >60 12/07/2009 02:50 PM    AGRATIO 1.8 10/31/2023 08:25 AM    LABGLOM >90 09/24/2024 04:39 PM    LABGLOM >60 10/31/2023 08:25 AM    GLUCOSE 87 09/16/2024 08:20 AM    CALCIUM 9.0 09/16/2024 08:20 AM    BILITOT 0.5 10/31/2023 08:25 AM    ALKPHOS 86 10/31/2023 08:25 AM    AST 20 10/31/2023 08:25 AM    ALT 18 10/31/2023 08:25 AM       POC Tests: No results for input(s): \"POCGLU\", \"POCNA\", \"POCK\", \"POCCL\", \"POCBUN\", \"POCHEMO\", \"POCHCT\" in the last 72 hours.    Coags:   Lab Results   Component Value Date/Time    PROTIME 11.5 12/30/2015 08:30 AM    INR 1.01 12/30/2015 08:30 AM    APTT 31.2 12/30/2015 08:30 AM       HCG (If Applicable): No results found for: \"PREGTESTUR\", \"PREGSERUM\", \"HCG\", \"HCGQUANT\"     ABGs:   Lab Results   Component Value Date/Time    PHART 7.360 01/06/2016 03:04 PM    PO2ART 146 01/06/2016 03:04 PM    THP7WIT 40 01/06/2016 03:04 PM    WXH5PED 22.8 01/06/2016 03:04 PM    BEART -3 01/06/2016 03:04 PM

## 2024-12-16 NOTE — H&P
Department of Cardiology Pre-operative History and Physical        DIAGNOSIS:  murmur    INDICATION:  prosthetic valve endocarditis    PROCEDURE:  Transesophageal echocardiogram    CHIEF COMPLAINT:  worsening heart failure, valvular dysfunction    History Obtained From:  patient    HISTORY OF PRESENT ILLNESS:      The patient is a 74 y.o. male with significant past medical history of aortic who presents with ***    Past Medical History:    {PAST MEDICAL HISTORY:837616361}  Past Surgical History:    {PAST SURGICAL HISTORY:597402472}  Medications Prior to Admission:   {MEDS PRIOR TO ADMISSION:714924125}  Allergies:  Patient has no known allergies.    Social History:    {SOCIAL HISTORY:781332257}  Family History:    {FAMILY HISTORY:564309896}  REVIEW OF SYSTEMS:    {REVIEW OF SYSTEMS:264619787}    PHYSICAL EXAM:  @FLOW[[id number]]@  {VITALS/DRAINS/VENT:635161823}    Eyes:  {EYES:244117476}    Head/ENT:  {ENT:576887709}    Neck:  {NECK:112659948}    Heart:  {CARDIOVASCULAR:803153615}    Lungs:  {LUNGS:329155260}    Abdomen:  {ABDOMEN:873434035}    Extremities:  {EXTREMITIES:390390776}    {PHYSICAL EXAM:419029256}  DATA:  {DATA REVIEW:719118213}    ASSESSMENT AND PLAN:    1.  Patient is a 74 y.o. male with above specified procedure planned *** {ANESTHESIA TYPE:773901298}    2.  Procedure options, risks and benefits reviewed with {PATIENT/GUARDIAN:896651477}.  {PATIENT/GUARDIAN:947770358} expresses understanding.

## 2024-12-16 NOTE — ANESTHESIA POSTPROCEDURE EVALUATION
Department of Anesthesiology  Postprocedure Note    Patient: Freddy Trinidad  MRN: [id number]  YOB: 1950  Date of evaluation: 12/16/2024    Procedure Summary       Date: 12/16/24 Room / Location: [facility]    Anesthesia Start: 1316 Anesthesia Stop: 1402    Procedure: HAJA (PRN CONTRAST/BUBBLE/3D) Diagnosis:       Nonrheumatic aortic valve insufficiency      History of aortic valve replacement    Scheduled Providers: Zachariah Zurita DO Responsible Provider: Zachariah Zurita DO    Anesthesia Type: MAC ASA Status: 3            Anesthesia Type: No value filed.    Olive Phase I: Olive Score: 10    Olive Phase II:      Anesthesia Post Evaluation    Patient location during evaluation: PACU  Patient participation: complete - patient participated  Level of consciousness: awake  Airway patency: patent  Nausea & Vomiting: no nausea  Cardiovascular status: hemodynamically stable  Respiratory status: acceptable  Hydration status: euvolemic  Multimodal analgesia pain management approach  Pain management: adequate    No notable events documented.

## 2024-12-17 SDOH — HEALTH STABILITY: PHYSICAL HEALTH: ON AVERAGE, HOW MANY DAYS PER WEEK DO YOU ENGAGE IN MODERATE TO STRENUOUS EXERCISE (LIKE A BRISK WALK)?: 4 DAYS

## 2024-12-17 SDOH — ECONOMIC STABILITY: FOOD INSECURITY: WITHIN THE PAST 12 MONTHS, THE FOOD YOU BOUGHT JUST DIDN'T LAST AND YOU DIDN'T HAVE MONEY TO GET MORE.: NEVER TRUE

## 2024-12-17 SDOH — ECONOMIC STABILITY: INCOME INSECURITY: HOW HARD IS IT FOR YOU TO PAY FOR THE VERY BASICS LIKE FOOD, HOUSING, MEDICAL CARE, AND HEATING?: NOT HARD AT ALL

## 2024-12-17 SDOH — ECONOMIC STABILITY: FOOD INSECURITY: WITHIN THE PAST 12 MONTHS, YOU WORRIED THAT YOUR FOOD WOULD RUN OUT BEFORE YOU GOT MONEY TO BUY MORE.: NEVER TRUE

## 2024-12-17 SDOH — HEALTH STABILITY: PHYSICAL HEALTH: ON AVERAGE, HOW MANY MINUTES DO YOU ENGAGE IN EXERCISE AT THIS LEVEL?: 30 MIN

## 2024-12-17 ASSESSMENT — PATIENT HEALTH QUESTIONNAIRE - PHQ9
SUM OF ALL RESPONSES TO PHQ QUESTIONS 1-9: 0
SUM OF ALL RESPONSES TO PHQ QUESTIONS 1-9: 0
1. LITTLE INTEREST OR PLEASURE IN DOING THINGS: NOT AT ALL
SUM OF ALL RESPONSES TO PHQ QUESTIONS 1-9: 0
SUM OF ALL RESPONSES TO PHQ QUESTIONS 1-9: 0
SUM OF ALL RESPONSES TO PHQ9 QUESTIONS 1 & 2: 0
2. FEELING DOWN, DEPRESSED OR HOPELESS: NOT AT ALL

## 2024-12-17 ASSESSMENT — LIFESTYLE VARIABLES
HOW OFTEN DO YOU HAVE A DRINK CONTAINING ALCOHOL: 3
HOW OFTEN DO YOU HAVE A DRINK CONTAINING ALCOHOL: 2-4 TIMES A MONTH
HOW MANY STANDARD DRINKS CONTAINING ALCOHOL DO YOU HAVE ON A TYPICAL DAY: 1 OR 2
HOW OFTEN DO YOU HAVE SIX OR MORE DRINKS ON ONE OCCASION: 1
HOW MANY STANDARD DRINKS CONTAINING ALCOHOL DO YOU HAVE ON A TYPICAL DAY: 1

## 2024-12-18 ASSESSMENT — ENCOUNTER SYMPTOMS
DIARRHEA: 0
CONSTIPATION: 0
COUGH: 0
ABDOMINAL PAIN: 0
SHORTNESS OF BREATH: 0
NAUSEA: 0
WHEEZING: 0
VOMITING: 0

## 2024-12-20 ENCOUNTER — OFFICE VISIT (OUTPATIENT)
Dept: INTERNAL MEDICINE CLINIC | Age: 74
End: 2024-12-20

## 2024-12-20 VITALS
HEIGHT: 73 IN | DIASTOLIC BLOOD PRESSURE: 70 MMHG | OXYGEN SATURATION: 98 % | BODY MASS INDEX: 29.03 KG/M2 | WEIGHT: 219 LBS | HEART RATE: 72 BPM | SYSTOLIC BLOOD PRESSURE: 132 MMHG

## 2024-12-20 DIAGNOSIS — R97.20 ABNORMAL PSA: ICD-10-CM

## 2024-12-20 DIAGNOSIS — E78.49 OTHER HYPERLIPIDEMIA: ICD-10-CM

## 2024-12-20 DIAGNOSIS — Z00.00 MEDICARE ANNUAL WELLNESS VISIT, SUBSEQUENT: Primary | ICD-10-CM

## 2024-12-20 DIAGNOSIS — I42.9 PRIMARY CARDIOMYOPATHY (HCC): ICD-10-CM

## 2024-12-20 DIAGNOSIS — I10 ESSENTIAL HYPERTENSION: ICD-10-CM

## 2024-12-20 LAB
ECHO AV ACCELERATION TIME: 127 MS
ECHO AV AREA PEAK VELOCITY: 1.5 CM2
ECHO AV AREA VTI: 1.2 CM2
ECHO AV AREA/BSA PEAK VELOCITY: 0.7 CM2/M2
ECHO AV AREA/BSA VTI: 0.6 CM2/M2
ECHO AV MEAN GRADIENT: 20 MMHG
ECHO AV MEAN VELOCITY: 2.1 M/S
ECHO AV PEAK GRADIENT: 34 MMHG
ECHO AV PEAK VELOCITY: 2.9 M/S
ECHO AV VELOCITY RATIO: 0.41
ECHO AV VTI: 67.3 CM
ECHO BSA: 2.16 M2
ECHO LV EF PHYSICIAN: 40 %
ECHO LVOT AREA: 3.8 CM2
ECHO LVOT AV VTI INDEX: 0.31
ECHO LVOT DIAM: 2.2 CM
ECHO LVOT MEAN GRADIENT: 3 MMHG
ECHO LVOT PEAK GRADIENT: 5 MMHG
ECHO LVOT PEAK VELOCITY: 1.2 M/S
ECHO LVOT STROKE VOLUME INDEX: 37.1 ML/M2
ECHO LVOT SV: 79.8 ML
ECHO LVOT VTI: 21 CM
PSA SERPL DL<=0.01 NG/ML-MCNC: 4.14 NG/ML (ref 0–4)

## 2024-12-20 NOTE — PROGRESS NOTES
providers/suppliers regularly involved in providing care):   Patient Care Team:  Nancy Shah APRN - CNP as PCP - General (Nurse Practitioner)  Nancy Sahh APRN - CNP as PCP - Empaneled Provider  Ken Franklin MD as Consulting Physician (Cardiology)  Adiel Nair MD as Consulting Physician (Electrophysiology)      Reviewed and updated this visit:  Tobacco  Allergies  Meds  Med Hx  Surg Hx  Soc Hx  Fam Hx          Electronically signed by: AMY Burrosw CNP 12/20/24  
respiratory distress.      Breath sounds: Normal breath sounds. No wheezing or rales.   Chest:      Chest wall: No tenderness.   Abdominal:      General: Bowel sounds are normal.      Palpations: Abdomen is soft.   Skin:     General: Skin is warm and dry.   Neurological:      Mental Status: He is alert and oriented to person, place, and time.      Coordination: Coordination normal.   Psychiatric:         Mood and Affect: Mood normal.       Assessment and Plan:  Freddy was seen today for medicare awv.  Diagnoses and all orders for this visit:    Medicare annual wellness visit, subsequent   - See other OV note dated 12/20/24    Essential hypertension/Primary cardiomyopathy (HCC)   - BP stable. Asymptomatic   - Continue with cardiology.    Other hyperlipidemia   - Lifestyle modifications such as exercise, weight loss and healthy diet encouraged and reviewed with the pt.   - Ascvd risk score elevated. Regarding statins, pt states: \"I am not taking that.\"  - States that he would like to stay off of statin. Using lifestyle to control HLD   - Continue with cardiology.    Abnormal PSA  -     asymptomatic. Pt agreeable to repeat PSA.  - PSA, Prostatic Specific Antigen (Bro[facility] Ony); Future    Return in about 1 year (around 12/20/2025) for awv, or sooner if needed. Pt will call if symptoms worsen or fail to improve.    All questions answered. Pt states no further questions or concerns at this time.   Electronically signed by: AMY Burrows - CNP 12/20/24

## 2024-12-20 NOTE — PATIENT INSTRUCTIONS

## 2024-12-27 DIAGNOSIS — R97.20 ELEVATED PSA: Primary | ICD-10-CM

## 2025-09-02 RX ORDER — CARVEDILOL 12.5 MG/1
12.5 TABLET ORAL 2 TIMES DAILY WITH MEALS
Qty: 90 TABLET | Refills: 3 | Status: SHIPPED | OUTPATIENT
Start: 2025-09-02